# Patient Record
Sex: FEMALE | Race: BLACK OR AFRICAN AMERICAN | NOT HISPANIC OR LATINO | Employment: UNEMPLOYED | URBAN - METROPOLITAN AREA
[De-identification: names, ages, dates, MRNs, and addresses within clinical notes are randomized per-mention and may not be internally consistent; named-entity substitution may affect disease eponyms.]

---

## 2023-03-14 LAB
AMPHETAMINE (PRESENCE) IN URINE BY SCREEN METHOD: ABNORMAL
BARBITURATES PRESENCE IN URINE BY SCREEN METHOD: ABNORMAL
BENZODIAZEPINE (PRESENCE) IN URINE BY SCREEN METHOD: ABNORMAL
CANNABINOIDS IN URINE BY SCREEN METHOD: ABNORMAL
COCAINE (PRESENCE) IN URINE BY SCREEN METHOD: ABNORMAL
DRUG SCREEN COMMENT URINE: ABNORMAL
FENTANYL URINE: ABNORMAL
METHADONE (PRESENCE) IN URINE BY SCREEN METHOD: ABNORMAL
OPIATES (PRESENCE) IN URINE BY SCREEN METHOD: ABNORMAL
OXYCODONE (PRESENCE) IN URINE BY SCREEN METHOD: ABNORMAL
PHENCYCLIDINE (PRESENCE) IN URINE BY SCREEN METHOD: ABNORMAL

## 2023-03-16 LAB
6-ACETYLMORPHINE: <25 NG/ML
CODEINE: <50 NG/ML
HYDROCODONE: 498 NG/ML
HYDROMORPHONE: <25 NG/ML
MORPHINE URINE: <50 NG/ML
NORHYDROCODONE: 692 NG/ML
NOROXYCODONE: <25 NG/ML
OXYCODONE: <25 NG/ML
OXYMORPHONE: <25 NG/ML

## 2023-10-19 DIAGNOSIS — M54.16 LUMBAR RADICULITIS: Primary | ICD-10-CM

## 2023-10-19 RX ORDER — HYDROCODONE BITARTRATE AND ACETAMINOPHEN 5; 325 MG/1; MG/1
1 TABLET ORAL 2 TIMES DAILY PRN
COMMUNITY
Start: 2017-01-01 | End: 2023-10-19 | Stop reason: SDUPTHER

## 2023-10-19 RX ORDER — HYDROCODONE BITARTRATE AND ACETAMINOPHEN 5; 325 MG/1; MG/1
1 TABLET ORAL 2 TIMES DAILY PRN
Qty: 60 TABLET | Refills: 0 | Status: SHIPPED | OUTPATIENT
Start: 2023-10-19 | End: 2023-11-18

## 2024-01-16 ENCOUNTER — APPOINTMENT (OUTPATIENT)
Dept: PAIN MEDICINE | Facility: CLINIC | Age: 59
End: 2024-01-16
Payer: COMMERCIAL

## 2024-01-19 ENCOUNTER — APPOINTMENT (OUTPATIENT)
Dept: PAIN MEDICINE | Facility: CLINIC | Age: 59
End: 2024-01-19

## 2024-01-19 ENCOUNTER — TELEMEDICINE (OUTPATIENT)
Dept: PAIN MEDICINE | Facility: CLINIC | Age: 59
End: 2024-01-19
Payer: COMMERCIAL

## 2024-01-19 DIAGNOSIS — M96.1 FAILED BACK SYNDROME, CERVICAL: ICD-10-CM

## 2024-01-19 DIAGNOSIS — M54.40 CHRONIC LOW BACK PAIN WITH SCIATICA, SCIATICA LATERALITY UNSPECIFIED, UNSPECIFIED BACK PAIN LATERALITY: Primary | ICD-10-CM

## 2024-01-19 DIAGNOSIS — G89.29 CHRONIC LOW BACK PAIN WITH SCIATICA, SCIATICA LATERALITY UNSPECIFIED, UNSPECIFIED BACK PAIN LATERALITY: Primary | ICD-10-CM

## 2024-01-19 DIAGNOSIS — Z79.899 ENCOUNTER FOR LONG-TERM (CURRENT) USE OF HIGH-RISK MEDICATION: Primary | ICD-10-CM

## 2024-01-19 DIAGNOSIS — M54.16 LUMBAR RADICULOPATHY: ICD-10-CM

## 2024-01-19 DIAGNOSIS — M96.1 POSTLAMINECTOMY SYNDROME OF LUMBAR REGION: ICD-10-CM

## 2024-01-19 PROCEDURE — 99214 OFFICE O/P EST MOD 30 MIN: CPT | Performed by: NURSE PRACTITIONER

## 2024-01-19 RX ORDER — HYDROCODONE BITARTRATE AND ACETAMINOPHEN 5; 325 MG/1; MG/1
1 TABLET ORAL 2 TIMES DAILY PRN
Qty: 60 TABLET | Refills: 0 | Status: CANCELLED | OUTPATIENT
Start: 2024-01-19 | End: 2024-03-19

## 2024-01-19 RX ORDER — NALOXONE HYDROCHLORIDE 4 MG/.1ML
4 SPRAY NASAL AS NEEDED
Qty: 2 EACH | Refills: 0 | Status: SHIPPED | OUTPATIENT
Start: 2024-01-19

## 2024-01-19 RX ORDER — HYDROCODONE BITARTRATE AND ACETAMINOPHEN 5; 325 MG/1; MG/1
1 TABLET ORAL 2 TIMES DAILY PRN
Qty: 60 TABLET | Refills: 0 | Status: SHIPPED | OUTPATIENT
Start: 2024-01-19 | End: 2024-02-18

## 2024-01-19 RX ORDER — AMITRIPTYLINE HYDROCHLORIDE 25 MG/1
25 TABLET, FILM COATED ORAL NIGHTLY
Qty: 30 TABLET | Refills: 11 | COMMUNITY
Start: 2024-01-19 | End: 2024-04-17 | Stop reason: ALTCHOICE

## 2024-01-19 RX ORDER — ALPRAZOLAM 1 MG/1
1 TABLET ORAL 3 TIMES DAILY PRN
Qty: 30 TABLET | Refills: 0 | COMMUNITY
Start: 2024-01-19

## 2024-01-19 RX ORDER — DULOXETIN HYDROCHLORIDE 20 MG/1
20 CAPSULE, DELAYED RELEASE ORAL 2 TIMES DAILY
Qty: 60 CAPSULE | Refills: 5 | COMMUNITY
Start: 2024-01-19 | End: 2024-04-17 | Stop reason: WASHOUT

## 2024-01-19 NOTE — PROGRESS NOTES
Chief Complaint/HPI:  Pt. Presents today for telephone visit for medication refill. She endorses neck and low back pain with low back being more painful today. Low back pain extends down posterior right leg to the knee and is rated 6/10 today. Neck pain is rated 5/10 today and extends to both shoulders, primarily. PMH includes cervical and lumbar surgery. Daily movement includes doing stairs, and trying to ride a stationary bike. She has had several procedures with Dr. Ramires and these have been moderately beneficial for her.  She is maintained on hydrocodone.acetaminophen 5/325 mg BID prn, which she takes sparingly. Her daily medications of amitriptyline and Cymbalta will usually control her pain. She only take the hydrocodone if chronic pain increases. She is concurrently taking alprazolam.  The current medication regimen remains effective and she denies adverse side effects.    OARRS:  Laurence Stone, APRN-CNP on 1/19/2024  2:20 PM  I have personally reviewed the OARRS report for Jazzy Napoles. I have considered the risks of abuse, dependence, addiction and diversion    Is the patient prescribed a combination of a benzodiazepine and opioid?  Yes, I feel it is clincially indicated to continue the medication and have discussed with the patient risks/benefits/alternatives.    Last Urine Drug Screen / ordered today: Yes  Recent Results (from the past 8760 hour(s))   Opiate Confirmation, Urine    Collection Time: 03/14/23 11:53 AM   Result Value Ref Range    6-Acetylmorphine <25 Cutoff <25 ng/mL    Codeine <50 Cutoff <50 ng/mL    Hydrocodone 498 (A) Cutoff <25 ng/mL    Hydromorphone <25 Cutoff <25 ng/mL    Morphine Urine <50 Cutoff <50 ng/mL    Norhydrocodone 692 (A) Cutoff <25 ng/mL    Noroxycodone <25 Cutoff <25 ng/mL    Oxycodone <25 Cutoff <25 ng/mL    Oxymorphone <25 Cutoff <25 ng/mL   Drug Screen, Urine With Reflex to Confirmation    Collection Time: 03/14/23 11:53 AM   Result Value Ref Range    DRUG  SCREEN COMMENT URINE SEE BELOW     Amphetamine Screen, Urine PRESUMPTIVE NEGATIVE NEGATIVE    Barbiturate Screen, Urine PRESUMPTIVE NEGATIVE NEGATIVE    BENZODIAZEPINE (PRESENCE) IN URINE BY SCREEN METHOD PRESUMPTIVE NEGATIVE NEGATIVE    Cannabinoid Screen, Urine PRESUMPTIVE NEGATIVE NEGATIVE    Cocaine Screen, Urine PRESUMPTIVE NEGATIVE NEGATIVE    Fentanyl, Ur PRESUMPTIVE NEGATIVE NEGATIVE    Methadone Screen, Urine PRESUMPTIVE NEGATIVE NEGATIVE    Opiate Screen, Urine PRESUMPTIVE POSITIVE (A) NEGATIVE    Oxycodone Screen, Ur PRESUMPTIVE NEGATIVE NEGATIVE    PCP Screen, Urine PRESUMPTIVE NEGATIVE NEGATIVE     Results are as expected.     Controlled Substance Agreement:  Date of the Last Agreement: 1/19/2024  Reviewed Controlled Substance Agreement including but not limited to the benefits, risks, and alternatives to treatment with a Controlled Substance medication(s).      ROS otherwise negative aside from what was mentioned above in HPI.      There is no problem list on file for this patient.    Past Medical History:   Diagnosis Date    Other chronic pain     Chronic pain    Other conditions influencing health status 11/03/2014    History of dyspareunia    Personal history of other diseases of the digestive system     History of esophageal reflux    Personal history of other diseases of the musculoskeletal system and connective tissue     History of arthritis    Personal history of other diseases of the musculoskeletal system and connective tissue     History of low back pain    Personal history of other diseases of the musculoskeletal system and connective tissue     History of backache    Personal history of other mental and behavioral disorders     History of depression    Pure hypercholesterolemia, unspecified     High cholesterol     Past Surgical History:   Procedure Laterality Date    BREAST LUMPECTOMY  11/03/2014    Breast Surgery Lumpectomy    CERVICAL FUSION  10/23/2013    Cervical Vertebral Fusion      SECTION, CLASSIC  2014     Section    HYSTERECTOMY  2014    Hysterectomy    LUMBAR FUSION  10/23/2013    Lumbar Vertebral Fusion    OTHER SURGICAL HISTORY  2016    Oophorectomy - Bilateral (Removal Of Both Ovaries)     No family history on file.  Social History     Tobacco Use    Smoking status: Unknown         ALLERGIES  Allergies   Allergen Reactions    Bactrim [Sulfamethoxazole-Trimethoprim] Swelling    Celexa [Citalopram] Swelling    Darvocet A500 [Propoxyphene N-Acetaminophen] Swelling    Hydromorphone GI Upset    Methocarbamol Unknown    Morphine GI Upset    Pregabalin Swelling    Statins-Hmg-Coa Reductase Inhibitors Itching    Sulfa (Sulfonamide Antibiotics) Itching    Gabapentin Palpitations    Meloxicam Palpitations    Penicillin Palpitations         MEDICATIONS  Current Outpatient Medications   Medication Sig Dispense Refill    ALPRAZolam (Xanax) 1 mg tablet Take 1 tablet (1 mg) by mouth 3 times a day as needed for anxiety. 30 tablet 0    amitriptyline (Elavil) 25 mg tablet Take 1 tablet (25 mg) by mouth once daily at bedtime. 30 tablet 11    DULoxetine (Cymbalta) 20 mg DR capsule Take 1 capsule (20 mg) by mouth 2 times a day. Do not crush or chew. 60 capsule 5     No current facility-administered medications for this visit.     PHYSICAL EXAM  Due to the nature of telehealth the physical exam is limited.  Gen: Alert, NAD  Respiratory:  No SOB, normal effort  Psych: Appropriate, cooperative      ASSESSMENT/PLAN  Problem List Items Addressed This Visit    None  Visit Diagnoses       Lumbar radiculopathy              Encounter Diagnoses   Name Primary?    Lumbar radiculopathy     Postlaminectomy syndrome of lumbar region     Failed back syndrome, cervical     Encounter for long-term (current) use of high-risk medication Yes     Will continue plan of care as established by Dr. Ramires.  -Continue hydrocodone/acetaminophen as currently prescribed  -UDS and CSA updated today.  Pt. Has no further questions  -RTC 3 mos or as needed. Reinforced to pt. Every 3 mos visit    The patient was counseled on the appropriate use of the opioid medication, its potential dangers and the responsibilities that go along with being prescribed opioid medications including safe storage, use and disposal of the medication. Patient was also counseled on the adverse effects of long-term opioid use including hyperalgesia, tolerance, decreased immune function, and changes to the body's natural hormones.  The patient also understands the potential risks for respiratory depression especially if the medication is combined with other central nervous system depressants such as benzodiazepines or alcohol.    Laurence Stone, APRN-CNP

## 2024-01-25 ENCOUNTER — TELEPHONE (OUTPATIENT)
Dept: PAIN MEDICINE | Facility: HOSPITAL | Age: 59
End: 2024-01-25
Payer: COMMERCIAL

## 2024-01-25 NOTE — TELEPHONE ENCOUNTER
Ms. Saldana left message saying that her father had MI and she has been out of state so she hasn't gone to lab . She will give specimen when she returns to [place].

## 2024-02-01 ENCOUNTER — TELEPHONE (OUTPATIENT)
Dept: RHEUMATOLOGY | Facility: CLINIC | Age: 59
End: 2024-02-01

## 2024-02-01 NOTE — TELEPHONE ENCOUNTER
Refill Authorization Request  [facility]  Amitriptyline HCL 75mg 90 tabs

## 2024-02-26 ENCOUNTER — LAB (OUTPATIENT)
Dept: LAB | Facility: LAB | Age: 59
End: 2024-02-26
Payer: COMMERCIAL

## 2024-02-26 DIAGNOSIS — Z79.899 ENCOUNTER FOR LONG-TERM (CURRENT) USE OF HIGH-RISK MEDICATION: ICD-10-CM

## 2024-02-26 PROCEDURE — 80368 SEDATIVE HYPNOTICS: CPT

## 2024-02-26 PROCEDURE — 80373 DRUG SCREENING TRAMADOL: CPT

## 2024-02-26 PROCEDURE — 80365 DRUG SCREENING OXYCODONE: CPT

## 2024-02-26 PROCEDURE — 80346 BENZODIAZEPINES1-12: CPT

## 2024-02-26 PROCEDURE — 80354 DRUG SCREENING FENTANYL: CPT

## 2024-02-26 PROCEDURE — 80307 DRUG TEST PRSMV CHEM ANLYZR: CPT

## 2024-02-26 PROCEDURE — 82570 ASSAY OF URINE CREATININE: CPT

## 2024-02-26 PROCEDURE — 80358 DRUG SCREENING METHADONE: CPT

## 2024-02-26 PROCEDURE — 80361 OPIATES 1 OR MORE: CPT

## 2024-02-27 LAB
AMPHETAMINES UR QL SCN: NORMAL
BARBITURATES UR QL SCN: NORMAL
BZE UR QL SCN: NORMAL
CANNABINOIDS UR QL SCN: NORMAL
CREAT UR-MCNC: 119.7 MG/DL (ref 20–320)
PCP UR QL SCN: NORMAL

## 2024-02-28 ENCOUNTER — TELEPHONE (OUTPATIENT)
Dept: RHEUMATOLOGY | Facility: CLINIC | Age: 59
End: 2024-02-28

## 2024-02-29 LAB
1OH-MIDAZOLAM UR CFM-MCNC: <25 NG/ML
6MAM UR CFM-MCNC: <25 NG/ML
7AMINOCLONAZEPAM UR CFM-MCNC: <25 NG/ML
A-OH ALPRAZ UR CFM-MCNC: 66 NG/ML
ALPRAZ UR CFM-MCNC: 36 NG/ML
CHLORDIAZEP UR CFM-MCNC: <25 NG/ML
CLONAZEPAM UR CFM-MCNC: <25 NG/ML
CODEINE UR CFM-MCNC: <50 NG/ML
DIAZEPAM UR CFM-MCNC: <25 NG/ML
EDDP UR CFM-MCNC: <25 NG/ML
FENTANYL UR CFM-MCNC: <2.5 NG/ML
HYDROCODONE CTO UR CFM-MCNC: 464 NG/ML
HYDROMORPHONE UR CFM-MCNC: <25 NG/ML
LORAZEPAM UR CFM-MCNC: <25 NG/ML
METHADONE UR CFM-MCNC: <25 NG/ML
MIDAZOLAM UR CFM-MCNC: <25 NG/ML
MORPHINE UR CFM-MCNC: <50 NG/ML
NORDIAZEPAM UR CFM-MCNC: <25 NG/ML
NORFENTANYL UR CFM-MCNC: <2.5 NG/ML
NORHYDROCODONE UR CFM-MCNC: 531 NG/ML
NOROXYCODONE UR CFM-MCNC: <25 NG/ML
NORTRAMADOL UR-MCNC: <50 NG/ML
OXAZEPAM UR CFM-MCNC: <25 NG/ML
OXYCODONE UR CFM-MCNC: <25 NG/ML
OXYMORPHONE UR CFM-MCNC: <25 NG/ML
TEMAZEPAM UR CFM-MCNC: <25 NG/ML
TRAMADOL UR CFM-MCNC: <50 NG/ML
ZOLPIDEM UR CFM-MCNC: <25 NG/ML
ZOLPIDEM UR-MCNC: <25 NG/ML

## 2024-04-03 ENCOUNTER — APPOINTMENT (OUTPATIENT)
Dept: RADIOLOGY | Facility: CLINIC | Age: 59
End: 2024-04-03
Payer: COMMERCIAL

## 2024-04-09 ENCOUNTER — HOSPITAL ENCOUNTER (OUTPATIENT)
Dept: RADIOLOGY | Facility: CLINIC | Age: 59
Discharge: HOME | End: 2024-04-09
Payer: COMMERCIAL

## 2024-04-09 VITALS — BODY MASS INDEX: 31.24 KG/M2 | WEIGHT: 183 LBS | HEIGHT: 64 IN

## 2024-04-09 DIAGNOSIS — Z12.31 ENCOUNTER FOR SCREENING MAMMOGRAM FOR MALIGNANT NEOPLASM OF BREAST: ICD-10-CM

## 2024-04-09 PROCEDURE — 77067 SCR MAMMO BI INCL CAD: CPT | Mod: LIO | Performed by: STUDENT IN AN ORGANIZED HEALTH CARE EDUCATION/TRAINING PROGRAM

## 2024-04-09 PROCEDURE — 77067 SCR MAMMO BI INCL CAD: CPT | Mod: LIO

## 2024-04-09 PROCEDURE — 77063 BREAST TOMOSYNTHESIS BI: CPT | Mod: LIO | Performed by: STUDENT IN AN ORGANIZED HEALTH CARE EDUCATION/TRAINING PROGRAM

## 2024-04-10 ENCOUNTER — APPOINTMENT (OUTPATIENT)
Dept: RHEUMATOLOGY | Facility: CLINIC | Age: 59
End: 2024-04-10
Payer: COMMERCIAL

## 2024-04-15 NOTE — PROGRESS NOTES
Rheumatology Office Visit      Subjective     Patient ID: [id number]     Jazzy Napoles is a 58 y.o. female who presents for Med Refill    Subjective    Jazzy Napoles is a 59yo F w/a history of chronic pain 2/2 polyarticular OA, lumbar and cervicaly spondylosis with post-laminectomy syndrome, and fibromyalgia presenting for follow-up. Last seen 08/2023 at which time trial of topical diclofenac recommended and regimen otherwise maintained the same. She is established with pain mgmt.     Today patient states DIPs have gotten more prominent and bothersome if she uses hands or when she gets her nails done. She has constant diffuse pain throughout the body including all joints and the back. She feels movement distracts her from the pain so she feels better in the summer when she can be outside gardening. Changes in weather make pain worse. Has trouble falling asleep, has pain when she lays down at the end of the night, but once she's asleep she stays asleep. Melatonin occasionally helps. She is very sensitive to touch. Mood is good, she's been listening to Bible studies. Takes Vicodin as needed which helps, takes about 1/2 pill once a day or less. Still taking Cymbalta. Has LE edema at the end of the day but no other joint swelling. Appetite is good.      Current treatment:  - Amitriptyline 75mg daily  - Cymbalta 60mg daily  - Tizanidine 4mg BID PRN  - Lidocaine 5% patches  - Norco 5-325mg BID PRN (prescribed by pain mgmt)    Review of Systems  As per HPI     Objective  Temp: 36.5 °C (97.7 °F)  Heart Rate: 97  BP: 142/81 (high bp)    Physical Exam  General: Cooperative, in NAD   Eyes: Conjunctiva clear, sclera white, anicteric   Lungs: No respiratory distress; lungs CTAB; no wheezes, rales, rhonchi, or stridor   Heart: Normal S1 and S2; regular rate and rhythm; no murmurs, rubs, or gallops  Skin: No rashes, ulcers, tophi, or nodules noted  MSK:   Widespread myofascial TTP  Upper Extremities:   Hands: No  "erythema, warmth, synovitis of the DIPs, PIPs, or MCPs; normal ROM; bony hypertrophy at the DIPs w/TTP and squaring of the b/l CMCs  Wrists: No erythema, warmth, synovitis of the wrists; normal ROM  Elbows: No erythema, warmth, synovitis; normal ROM   Shoulders: No erythema, warmth, appreciable swelling; normal ROM   Lower Extremities:   Knees: No erythema, warmth, swelling; normal ROM   Ankles: No erythema, warmth, synovitis; normal ROM    Last BMP: No results found for: \"NA\", \"K\", \"CL\", \"CO2\", \"BUN\", \"GLU\", \"CREATININE\", \"CALCIUM\"  Last CBC: No components found for: \"CBC\"  Last CRP:   CRP (mg/dL)   Date Value   02/26/2019 1.46 (A)     Last ESR:   Sedimentation Rate (mm/h)   Date Value   02/26/2019 11     Last Hepatic Function Results: No results found for: \"BILITOT\", \"ALT\", \"AST\", \"ALKPHOS\", \"ALBUMIN\", \"PROT\"    Assessment/Plan   Diagnoses and all orders for this visit:  Fibromyalgia  -     amitriptyline (Elavil) 75 mg tablet; TAKE 1 TABLET (75 MG) BY MOUTH AT BEDTIME  -     DULoxetine (Cymbalta) 60 mg DR capsule; Take one tablet daily  -     lidocaine (Lidoderm) 5 % patch; Use one patch every 12 hours as needed for pain  -     tiZANidine (Zanaflex) 4 mg capsule; Take 1 capsule (4 mg) by mouth 2 times a day as needed for muscle spasms.  Polyarticular osteoarthritis  -     DULoxetine (Cymbalta) 60 mg DR capsule; Take one tablet daily  -     lidocaine (Lidoderm) 5 % patch; Use one patch every 12 hours as needed for pain  Post laminectomy syndrome  -     lidocaine (Lidoderm) 5 % patch; Use one patch every 12 hours as needed for pain    Jazzy WellsKayli is a 59yo F w/a history of chronic pain 2/2 polyarticular OA, lumbar and cervicaly spondylosis with post-laminectomy syndrome, and fibromyalgia presenting for follow-up. She has no autoimmune rheumatic disease and is established with pain mgmt for OA, post-laminectomy syndrome, and fibromyalgia. Refills provided for Elavil, Cymbalta, Zanaflex, and lidocaine patches " today. Pain mgmt is the prescriber of her opiate pain medication. Discussed conservative measures including exercise and sleep hygiene. She prefers to continue following with Rheumatology as well as PCP and pain mgmt for her conditions.     Follow-up 6 mos    Patient seen and discussed with attending Dr. Junaid Wilson MD  PGY-V, Rheumatology

## 2024-04-16 ENCOUNTER — APPOINTMENT (OUTPATIENT)
Dept: RHEUMATOLOGY | Facility: CLINIC | Age: 59
End: 2024-04-16
Payer: COMMERCIAL

## 2024-04-17 ENCOUNTER — OFFICE VISIT (OUTPATIENT)
Dept: RHEUMATOLOGY | Facility: CLINIC | Age: 59
End: 2024-04-17
Payer: COMMERCIAL

## 2024-04-17 VITALS
HEIGHT: 64 IN | HEART RATE: 97 BPM | TEMPERATURE: 97.7 F | DIASTOLIC BLOOD PRESSURE: 81 MMHG | SYSTOLIC BLOOD PRESSURE: 142 MMHG | BODY MASS INDEX: 30.56 KG/M2 | WEIGHT: 179 LBS

## 2024-04-17 DIAGNOSIS — M96.1 POST LAMINECTOMY SYNDROME: ICD-10-CM

## 2024-04-17 DIAGNOSIS — M79.7 FIBROMYALGIA: Primary | ICD-10-CM

## 2024-04-17 DIAGNOSIS — M15.9 POLYARTICULAR OSTEOARTHRITIS: ICD-10-CM

## 2024-04-17 PROCEDURE — 99214 OFFICE O/P EST MOD 30 MIN: CPT | Mod: GC | Performed by: STUDENT IN AN ORGANIZED HEALTH CARE EDUCATION/TRAINING PROGRAM

## 2024-04-17 PROCEDURE — 99214 OFFICE O/P EST MOD 30 MIN: CPT | Performed by: STUDENT IN AN ORGANIZED HEALTH CARE EDUCATION/TRAINING PROGRAM

## 2024-04-17 RX ORDER — LORATADINE 10 MG/1
10 TABLET ORAL EVERY 12 HOURS
COMMUNITY
Start: 2023-07-25

## 2024-04-17 RX ORDER — DULOXETIN HYDROCHLORIDE 60 MG/1
CAPSULE, DELAYED RELEASE ORAL
COMMUNITY
End: 2024-04-17 | Stop reason: SDUPTHER

## 2024-04-17 RX ORDER — CHLORHEXIDINE GLUCONATE ORAL RINSE 1.2 MG/ML
SOLUTION DENTAL
COMMUNITY
Start: 2024-03-26

## 2024-04-17 RX ORDER — TIZANIDINE HYDROCHLORIDE 4 MG/1
4 CAPSULE, GELATIN COATED ORAL 2 TIMES DAILY PRN
Qty: 180 CAPSULE | Refills: 1 | Status: SHIPPED | OUTPATIENT
Start: 2024-04-17 | End: 2024-10-14

## 2024-04-17 RX ORDER — LIDOCAINE 50 MG/G
PATCH TOPICAL
COMMUNITY
Start: 2016-11-29 | End: 2024-04-17 | Stop reason: SDUPTHER

## 2024-04-17 RX ORDER — METOPROLOL TARTRATE 25 MG/1
TABLET, FILM COATED ORAL EVERY 12 HOURS
COMMUNITY
Start: 2014-08-06

## 2024-04-17 RX ORDER — AMITRIPTYLINE HYDROCHLORIDE 75 MG/1
TABLET ORAL
Qty: 90 TABLET | Refills: 1 | Status: SHIPPED | OUTPATIENT
Start: 2024-04-17

## 2024-04-17 RX ORDER — LIDOCAINE 50 MG/G
PATCH TOPICAL
Qty: 30 PATCH | Refills: 5 | Status: SHIPPED | OUTPATIENT
Start: 2024-04-17

## 2024-04-17 RX ORDER — TIZANIDINE HYDROCHLORIDE 4 MG/1
4 CAPSULE, GELATIN COATED ORAL
COMMUNITY
Start: 2017-03-23 | End: 2024-04-17 | Stop reason: SDUPTHER

## 2024-04-17 RX ORDER — AMITRIPTYLINE HYDROCHLORIDE 75 MG/1
TABLET ORAL
COMMUNITY
End: 2024-04-17 | Stop reason: ALTCHOICE

## 2024-04-17 RX ORDER — HYDROCODONE BITARTRATE AND ACETAMINOPHEN 5; 325 MG/1; MG/1
1 TABLET ORAL 2 TIMES DAILY PRN
COMMUNITY
Start: 2017-01-01 | End: 2024-05-28 | Stop reason: SDUPTHER

## 2024-04-17 RX ORDER — SEMAGLUTIDE 1.34 MG/ML
1 INJECTION, SOLUTION SUBCUTANEOUS
COMMUNITY
Start: 2023-09-22

## 2024-04-17 RX ORDER — METFORMIN HYDROCHLORIDE 500 MG/1
TABLET, EXTENDED RELEASE ORAL
COMMUNITY
End: 2024-06-10 | Stop reason: WASHOUT

## 2024-04-17 RX ORDER — GLIPIZIDE 10 MG/1
TABLET ORAL
COMMUNITY
Start: 2014-11-03

## 2024-04-17 RX ORDER — OMEPRAZOLE 20 MG/1
20 CAPSULE, DELAYED RELEASE ORAL
COMMUNITY
Start: 2022-01-04 | End: 2025-03-14

## 2024-04-17 RX ORDER — DULOXETIN HYDROCHLORIDE 60 MG/1
CAPSULE, DELAYED RELEASE ORAL
Qty: 90 CAPSULE | Refills: 1 | Status: SHIPPED | OUTPATIENT
Start: 2024-04-17

## 2024-04-17 RX ORDER — GLUCOSAM/CHONDRO/HERB 149/HYAL 750-100 MG
TABLET ORAL
COMMUNITY

## 2024-04-17 ASSESSMENT — PAIN SCALES - GENERAL: PAINLEVEL: 5

## 2024-04-17 NOTE — PROGRESS NOTES
I saw and evaluated the patient. I personally obtained the key and critical portions of the history and physical exam or was physically present for key and critical portions performed by the trainee. I reviewed the trainee's documentation and discussed the patient with the trainee. I agree with the trainee's medical decision making, as documented on the trainee's note.     Justice Quiroga MD

## 2024-05-28 ENCOUNTER — OFFICE VISIT (OUTPATIENT)
Dept: PAIN MEDICINE | Facility: HOSPITAL | Age: 59
End: 2024-05-28
Payer: COMMERCIAL

## 2024-05-28 DIAGNOSIS — M54.16 LUMBAR RADICULOPATHY: Primary | ICD-10-CM

## 2024-05-28 DIAGNOSIS — M54.16 LUMBAR RADICULOPATHY: ICD-10-CM

## 2024-05-28 DIAGNOSIS — M96.1 POSTLAMINECTOMY SYNDROME OF LUMBAR REGION: ICD-10-CM

## 2024-05-28 PROCEDURE — 99214 OFFICE O/P EST MOD 30 MIN: CPT | Performed by: ANESTHESIOLOGY

## 2024-05-28 RX ORDER — HYDROCODONE BITARTRATE AND ACETAMINOPHEN 5; 325 MG/1; MG/1
1 TABLET ORAL 2 TIMES DAILY PRN
Qty: 60 TABLET | Refills: 0 | Status: SHIPPED | OUTPATIENT
Start: 2024-05-28 | End: 2024-06-27

## 2024-05-28 ASSESSMENT — PAIN SCALES - GENERAL: PAINLEVEL: 7

## 2024-05-28 NOTE — PROGRESS NOTES
Chief Complaint   Patient presents with    Back Pain    Extremity Pain       History of present illness:  Jazzy is a 58-year-old female with a history of back and leg symptoms.  She has had prior lumbar fusion at L3-4.  She was last seen on 2/27/2023 for a bilateral L4 transforaminal which was targeting the area just below her prior surgery.  The patient had 100% relief of her symptoms following the aforementioned epidural injection until about 2 months ago when the pain slowly restarted.  Pain has also only recurred on the right side.  She is having radicular symptoms all the way down the leg to the ankle.  She does take Vicodin very sparingly.  She uses 60 tabs of Vicodin January 19th to current.  She denies any negative effects with Vicodin and notes improvement in her symptoms and function.    Pain at times can now be up to an 8 out of 10.    Controlled substance documentation:    I have personally reviewed the OARRS report today 5/28/2024.  I have considered the risks of abuse, dependence, addiction, and diversion.  I feel that is clinically indicated to continue this medication regimen after consideration of alternative therapies and other nonopioid treatments.  I have reviewed the controlled substance agreement line by line with the patient and they have verbalized understanding.    Last date of urine drug screen:  -1/19/2024  Last date controlled substance agreement obtained:  -1/19/2024  Opioid risk tool:  Last date ORT completed: Today  Risk level: Low risk    Alternatives to opioid management:  -NSAIDs, Tylenol, muscle relaxer, membrane stabilizer, physical therapy, surgical intervention, interventional pain procedures    Review of systems: 13 point review of systems was completed and is negative unless stated above in HPI    Physical exam:  Gen.: Patient appears to be stated age, fair hygiene  Eyes: Sclera clear  ENT: Hearing is grossly intact  Neck: No JVD noted, tracheal position is  midline  Respiratory: No SOB  Cardiovascular: Extremity show no edema or varicosities  Skin: No rashes or open lesions or ulcers identified on the skin  Musculoskeletal: No weakness, positive SLR on right otherwise unremarkable  Neurologic: Cranial nerves II through XII are grossly intact  Psychiatric:  Patient is alert and oriented x3    Imaging/Labs:  MRN: [id number]  Patient Name: ADAMARIS GRAVES     STUDY:  MRI L-SPINE WO;  12/30/2022 6:45 pm     INDICATION:  low back pain  M51.26: Lumbar herniated disc.     COMPARISON:  November 2015.     ACCESSION NUMBER(S):  [id number]     ORDERING CLINICIAN:  MATA TIPTON     TECHNIQUE:  The lumbar spine was studied in the sagital, axial and coronal planes  utiliing T1 and T2 weighted images.     FINDINGS:  The marrow signal and vertebral body height are normal. The conus and  sacrum are normal.  Images at each interspace reveal the following:  L1/L2  There is normal alignment and vertebral body height. The disc space  is normal. There is no evidence of canal or foraminal narrowing.  There is no evidence of bulging or herniated disc.  L2/L3  Bilateral facet hypertrophy and mild bulging disc without canal or  foraminal narrowing  L3/L4  Previous interbody fusion and posterior fixation without canal or  foraminal narrowing  L4/L5  Previous laminectomy with residual deformity of the thecal sac  unchanged from the previous exam. There is been interval development  of a midline disc herniation or focus of granulation tissue measuring  approximally 5 mm x 10 mm in size and best appreciated on  parasagittal T2 weighted image 11/19. There is been progressive  narrowing of the thecal sac at this level which is best appreciated  on axial 10/24 compared to the previous exam.  L5/S1  Bilateral facet hypertrophy and bulging intervertebral disc. No  measurable canal stenosis. Severe bilateral foraminal narrowing.        IMPRESSION:  * Postoperative changes as previously  described  *Unchanged bulging discs at L4/L5 and L5/S1 with progressive canal  narrowing at L4/L5 due to a midline soft tissue structure consistent  with herniated disc fragment or evolving granulation tissue.    Impression and plan:  58-year-old female with a history of back and leg symptoms and prior lumbar fusion.  The patient has known degenerative changes most significant just below her prior surgery.  She had an epidural injection, transforaminal which gave her 100% relief from February 2023 until 2 months ago.  Patient had recurrence of symptoms on the right side.    -Will plan for right-sided transforaminal at L4.  Procedure, risk, benefits, alternatives reviewed with the patient.    -Patient is up-to-date on her urine drug screen and controlled substance agreement which were completed in January.  Will renew Vicodin 5/325 total number of tabs 60 to be dispensed.  She uses the medication sparingly and her last refill was on 1/19/2024.  She does take occasional alprazolam but does not take it concurrently with Vicodin.  Risks associated with concurrent benzo and he would use were reviewed.    -Encouraged to work on physical therapy and core strengthening.    -Discussed with patient that at some point injections may not help and if her symptoms progress or are not relieved with conservative measures she may need to see the spine surgery back.

## 2024-06-06 ENCOUNTER — APPOINTMENT (OUTPATIENT)
Dept: OBSTETRICS AND GYNECOLOGY | Facility: HOSPITAL | Age: 59
End: 2024-06-06
Payer: COMMERCIAL

## 2024-06-07 PROBLEM — E78.00 HYPERCHOLESTEROLEMIA: Status: ACTIVE | Noted: 2023-05-23

## 2024-06-07 PROBLEM — M79.7 FIBROMYALGIA: Status: ACTIVE | Noted: 2024-06-07

## 2024-06-07 PROBLEM — M26.609 TMJ DISEASE: Status: ACTIVE | Noted: 2024-06-07

## 2024-06-07 PROBLEM — M47.816 SPONDYLOSIS WITHOUT MYELOPATHY OR RADICULOPATHY, LUMBAR REGION: Status: ACTIVE | Noted: 2024-06-07

## 2024-06-07 PROBLEM — M19.049 OSTEOARTHRITIS, HAND: Status: ACTIVE | Noted: 2024-06-07

## 2024-06-07 PROBLEM — K21.9 LARYNGOPHARYNGEAL REFLUX (LPR): Status: ACTIVE | Noted: 2024-06-07

## 2024-06-07 PROBLEM — M51.26 LUMBAR HERNIATED DISC: Status: ACTIVE | Noted: 2024-06-07

## 2024-06-07 PROBLEM — M47.812 SPONDYLOSIS OF CERVICAL REGION WITHOUT MYELOPATHY OR RADICULOPATHY: Status: ACTIVE | Noted: 2024-06-07

## 2024-06-07 PROBLEM — M15.9 OSTEOARTHRITIS OF MULTIPLE JOINTS: Status: ACTIVE | Noted: 2023-11-14

## 2024-06-07 PROBLEM — M79.2 PERIPHERAL NEUROPATHIC PAIN: Status: ACTIVE | Noted: 2024-06-07

## 2024-06-07 PROBLEM — I10 HYPERTENSION, BENIGN: Status: ACTIVE | Noted: 2023-05-26

## 2024-06-07 PROBLEM — F33.1 MODERATE EPISODE OF RECURRENT MAJOR DEPRESSIVE DISORDER (MULTI): Status: ACTIVE | Noted: 2024-05-21

## 2024-06-07 PROBLEM — K29.70 GASTRITIS: Status: ACTIVE | Noted: 2023-05-24

## 2024-06-07 PROBLEM — E55.9 VITAMIN D DEFICIENCY: Status: ACTIVE | Noted: 2023-05-26

## 2024-06-07 PROBLEM — F41.1 GENERALIZED ANXIETY DISORDER: Status: ACTIVE | Noted: 2023-05-26

## 2024-06-07 PROBLEM — E11.9 DIABETES MELLITUS (MULTI): Status: ACTIVE | Noted: 2023-02-27

## 2024-06-07 PROBLEM — E11.9 TYPE 2 DIABETES MELLITUS WITHOUT COMPLICATION (MULTI): Status: ACTIVE | Noted: 2023-05-26

## 2024-06-10 ENCOUNTER — OFFICE VISIT (OUTPATIENT)
Dept: CARDIOLOGY | Facility: CLINIC | Age: 59
End: 2024-06-10
Payer: COMMERCIAL

## 2024-06-10 VITALS
WEIGHT: 181.06 LBS | BODY MASS INDEX: 30.91 KG/M2 | HEART RATE: 89 BPM | OXYGEN SATURATION: 91 % | DIASTOLIC BLOOD PRESSURE: 75 MMHG | HEIGHT: 64 IN | SYSTOLIC BLOOD PRESSURE: 128 MMHG

## 2024-06-10 DIAGNOSIS — I10 HYPERTENSION, BENIGN: ICD-10-CM

## 2024-06-10 PROCEDURE — 1036F TOBACCO NON-USER: CPT | Performed by: INTERNAL MEDICINE

## 2024-06-10 PROCEDURE — 3060F POS MICROALBUMINURIA REV: CPT | Performed by: INTERNAL MEDICINE

## 2024-06-10 PROCEDURE — 3074F SYST BP LT 130 MM HG: CPT | Performed by: INTERNAL MEDICINE

## 2024-06-10 PROCEDURE — 3078F DIAST BP <80 MM HG: CPT | Performed by: INTERNAL MEDICINE

## 2024-06-10 PROCEDURE — 4010F ACE/ARB THERAPY RXD/TAKEN: CPT | Performed by: INTERNAL MEDICINE

## 2024-06-10 PROCEDURE — 99214 OFFICE O/P EST MOD 30 MIN: CPT | Performed by: INTERNAL MEDICINE

## 2024-06-10 PROCEDURE — 99204 OFFICE O/P NEW MOD 45 MIN: CPT | Performed by: INTERNAL MEDICINE

## 2024-06-10 RX ORDER — METFORMIN HYDROCHLORIDE 1000 MG/1
1000 TABLET ORAL
COMMUNITY
Start: 2024-06-04

## 2024-06-10 RX ORDER — LOSARTAN POTASSIUM 50 MG/1
50 TABLET ORAL DAILY
COMMUNITY

## 2024-06-10 ASSESSMENT — PAIN SCALES - GENERAL: PAINLEVEL: 0-NO PAIN

## 2024-06-10 ASSESSMENT — ENCOUNTER SYMPTOMS
BLOATING: 0
CONSTIPATION: 0
MEMORY LOSS: 0
VOMITING: 0
HEADACHES: 0
FEVER: 0
COUGH: 0
OCCASIONAL FEELINGS OF UNSTEADINESS: 0
CHILLS: 0
DEPRESSION: 0
LOSS OF SENSATION IN FEET: 0
DEPRESSION: 1
NAUSEA: 0
MYALGIAS: 0
DIARRHEA: 0
FALLS: 0
ALTERED MENTAL STATUS: 0
DYSURIA: 0
HEMOPTYSIS: 0
HEMATURIA: 0
WHEEZING: 0
ABDOMINAL PAIN: 0

## 2024-06-10 ASSESSMENT — PATIENT HEALTH QUESTIONNAIRE - PHQ9
SUM OF ALL RESPONSES TO PHQ9 QUESTIONS 1 AND 2: 2
10. IF YOU CHECKED OFF ANY PROBLEMS, HOW DIFFICULT HAVE THESE PROBLEMS MADE IT FOR YOU TO DO YOUR WORK, TAKE CARE OF THINGS AT HOME, OR GET ALONG WITH OTHER PEOPLE: NOT DIFFICULT AT ALL
1. LITTLE INTEREST OR PLEASURE IN DOING THINGS: SEVERAL DAYS
2. FEELING DOWN, DEPRESSED OR HOPELESS: SEVERAL DAYS

## 2024-06-10 ASSESSMENT — COLUMBIA-SUICIDE SEVERITY RATING SCALE - C-SSRS
2. HAVE YOU ACTUALLY HAD ANY THOUGHTS OF KILLING YOURSELF?: NO
1. IN THE PAST MONTH, HAVE YOU WISHED YOU WERE DEAD OR WISHED YOU COULD GO TO SLEEP AND NOT WAKE UP?: NO
6. HAVE YOU EVER DONE ANYTHING, STARTED TO DO ANYTHING, OR PREPARED TO DO ANYTHING TO END YOUR LIFE?: NO

## 2024-06-10 NOTE — PROGRESS NOTES
Chief Complaint   Patient presents with    New Patient Visit    Hypertension    Chest Pain      HPI  59 yo BF w/ h/o HTN, HLD, DM, GERD now here for cardiology consult. 4/24 hosp for CP at rest x 2 hours (no assoc symptoms) -> TPN/ECG neg. She has long h/o occ CP at rest x few minutes, no radiation, no assoc symptoms. No CP with walking 3d/wk.  No dyspnea at rest. No WAGNER unless stairs. No orthopnea/PND. No palps. No LH/dizzy/syncope. No edema. No claudication. No cough.   ECG 1/17: SR (93), normal  ECG 4/24: SR (88), normal  Nuc 11/15: no ischemia/scar, EF >65%  CTA chest 4/24: no CAC, nl heart size, no peric eff, nl Ao, nl PA  CTA ab 4/24: no AAA, patent vessels  RLE US 12/22: no DVT  MRI brain 4/24: no acute abnl  MRA head/neck 4/24: no sig stenois    Review of Systems   Constitutional: Negative for chills, fever and malaise/fatigue.   HENT:  Negative for hearing loss.    Eyes:  Negative for visual disturbance.   Respiratory:  Negative for cough, hemoptysis and wheezing.    Skin:  Negative for rash.   Musculoskeletal:  Negative for falls and myalgias.   Gastrointestinal:  Negative for bloating, abdominal pain, constipation, diarrhea, dysphagia, nausea and vomiting.   Genitourinary:  Negative for dysuria and hematuria.   Neurological:  Negative for headaches.   Psychiatric/Behavioral:  Negative for altered mental status, depression and memory loss.       Social History     Tobacco Use   Smoking Status Never   Smokeless Tobacco Never      Family History   Problem Relation Name Age of Onset    Breast cancer Paternal Grandmother        Allergies   Allergen Reactions    Bactrim [Sulfamethoxazole-Trimethoprim] Swelling    Celexa [Citalopram] Swelling    Darvocet A500 [Propoxyphene N-Acetaminophen] Swelling    Hydromorphone GI Upset    Methocarbamol Unknown    Morphine GI Upset    Pregabalin Swelling    Statins-Hmg-Coa Reductase Inhibitors Itching    Sulfa (Sulfonamide Antibiotics) Itching    Gabapentin Palpitations     Meloxicam Palpitations    Penicillin Palpitations      Current Outpatient Medications   Medication Instructions    ALPRAZolam (XANAX) 1 mg, oral, 3 times daily PRN    amitriptyline (Elavil) 75 mg tablet TAKE 1 TABLET (75 MG) BY MOUTH AT BEDTIME    chlorhexidine (Peridex) 0.12 % solution RINSE 15 ML'S TWICE DAILY AFTER BREAKFAST/BEFORE BEDTIME FOLLOWING BRUSHING AND FLOSSING    DULoxetine (Cymbalta) 60 mg DR capsule Take one tablet daily    glipiZIDE (Glucotrol) 10 mg tablet     HYDROcodone-acetaminophen (Norco) 5-325 mg tablet 1 tablet, oral, 2 times daily PRN    lidocaine (Lidoderm) 5 % patch Use one patch every 12 hours as needed for pain    loratadine (CLARITIN) 10 mg, oral, Every 12 hours    losartan (COZAAR) 50 mg, oral, Daily    metFORMIN (GLUCOPHAGE) 1,000 mg, oral, 2 times daily (morning and late afternoon)    metFORMIN XR (metFORMIN, MOD,) 500 mg 24 hr tablet oral    metoprolol tartrate (Lopressor) 25 mg tablet oral, Every 12 hours    naloxone (NARCAN) 4 mg, nasal, As needed, May repeat every 2-3 minutes if needed, alternating nostrils, until medical assistance becomes available.    omega 3-dha-epa-fish oil (Fish OiL) 1,000 mg (120 mg-180 mg) capsule oral    omeprazole (PRILOSEC) 20 mg, oral    Ozempic 1 mg, subcutaneous    tiZANidine (ZANAFLEX) 4 mg, oral, 2 times daily PRN      Vitals:    06/10/24 1119   BP: 128/75   Pulse: 89   SpO2:       Physical Exam  Constitutional:       Appearance: Normal appearance.   HENT:      Head: Normocephalic and atraumatic.      Nose: Nose normal.   Neck:      Vascular: No carotid bruit.   Cardiovascular:      Rate and Rhythm: Normal rate and regular rhythm.      Heart sounds: No murmur heard.  Pulmonary:      Effort: Pulmonary effort is normal.      Breath sounds: Normal breath sounds.   Abdominal:      Palpations: Abdomen is soft.      Tenderness: There is no abdominal tenderness.   Musculoskeletal:      Right lower leg: No edema.      Left lower leg: No edema.   Skin:      General: Skin is warm and dry.   Neurological:      General: No focal deficit present.      Mental Status: She is alert.   Psychiatric:         Mood and Affect: Mood normal.         Judgment: Judgment normal.       Labs  4/24 Cr 0.78, K 4.2, Mg 1.8, LFT nl, , HDL 47, , Chol 231, HGB 12.1, , HSTPN 7     Assessment/Plan   57 yo BF w/ h/o HTN, HLD, DM, GERD now s/p fairly atypical CP most c/w musculoskeletal etiology. TPN neg despite 2hrs of CP goes against cardiac. ECG normal. CTA chest nl including no CAC. She walks 3d/wk with no CP. If CP increases, sep if more typical (ie with exertion), consider stress test (which she prefers to hold of for now).   -continue Metoprolol 25 bid (consider change to Coreg which is better for sugars  -continue Losartain 50 every day  -continue Zetia for HLD (intolerant of statins) -> goal LDL <100 (although with no CAC, not as strong)  ROB Zayas MD

## 2024-06-10 NOTE — PATIENT INSTRUCTIONS
If get chest pain with walking (or meet deductible), let me know so can order you a stress test ([phone])

## 2024-06-20 ENCOUNTER — APPOINTMENT (OUTPATIENT)
Dept: OBSTETRICS AND GYNECOLOGY | Facility: CLINIC | Age: 59
End: 2024-06-20
Payer: COMMERCIAL

## 2024-06-28 ENCOUNTER — HOSPITAL ENCOUNTER (OUTPATIENT)
Dept: RADIOLOGY | Facility: HOSPITAL | Age: 59
Discharge: HOME | End: 2024-06-28
Payer: COMMERCIAL

## 2024-06-28 VITALS
RESPIRATION RATE: 16 BRPM | HEART RATE: 92 BPM | HEIGHT: 64 IN | DIASTOLIC BLOOD PRESSURE: 70 MMHG | BODY MASS INDEX: 31.41 KG/M2 | OXYGEN SATURATION: 96 % | TEMPERATURE: 97 F | WEIGHT: 184 LBS | SYSTOLIC BLOOD PRESSURE: 130 MMHG

## 2024-06-28 DIAGNOSIS — M54.16 LUMBAR RADICULOPATHY: ICD-10-CM

## 2024-06-28 PROCEDURE — 2720000007 HC OR 272 NO HCPCS

## 2024-06-28 PROCEDURE — 64483 NJX AA&/STRD TFRM EPI L/S 1: CPT | Performed by: ANESTHESIOLOGY

## 2024-06-28 PROCEDURE — 64483 NJX AA&/STRD TFRM EPI L/S 1: CPT | Mod: RT | Performed by: ANESTHESIOLOGY

## 2024-06-28 ASSESSMENT — PAIN - FUNCTIONAL ASSESSMENT
PAIN_FUNCTIONAL_ASSESSMENT: 0-10
PAIN_FUNCTIONAL_ASSESSMENT: 0-10

## 2024-06-28 ASSESSMENT — PAIN SCALES - GENERAL
PAINLEVEL_OUTOF10: 4
PAINLEVEL_OUTOF10: 8
PAINLEVEL_OUTOF10: 8

## 2024-06-28 ASSESSMENT — COLUMBIA-SUICIDE SEVERITY RATING SCALE - C-SSRS
1. IN THE PAST MONTH, HAVE YOU WISHED YOU WERE DEAD OR WISHED YOU COULD GO TO SLEEP AND NOT WAKE UP?: NO
2. HAVE YOU ACTUALLY HAD ANY THOUGHTS OF KILLING YOURSELF?: NO
6. HAVE YOU EVER DONE ANYTHING, STARTED TO DO ANYTHING, OR PREPARED TO DO ANYTHING TO END YOUR LIFE?: NO

## 2024-06-28 NOTE — H&P
Pain Management H&P    History Of Present Illness  Jazzy Napoles is a 58 y.o. female presents for procedure state below. Endorses no changes in past medical history or medical health since last seen in clinic.      Past Medical History  She has a past medical history of Other chronic pain, Other conditions influencing health status (2014), Personal history of other diseases of the digestive system, Personal history of other diseases of the musculoskeletal system and connective tissue, Personal history of other diseases of the musculoskeletal system and connective tissue, Personal history of other diseases of the musculoskeletal system and connective tissue, Personal history of other mental and behavioral disorders, and Pure hypercholesterolemia, unspecified.    Surgical History  She has a past surgical history that includes Lumbar fusion (10/23/2013); Cervical fusion (10/23/2013); Other surgical history (2016); Hysterectomy (2014); Breast lumpectomy (2014); and  section, classic (2014).     Social History  She reports that she has never smoked. She has never used smokeless tobacco. She reports that she does not drink alcohol. No history on file for drug use.    Family History  Family History   Problem Relation Name Age of Onset    Breast cancer Paternal Grandmother          Allergies  Bactrim [sulfamethoxazole-trimethoprim], Celexa [citalopram], Darvocet a500 [propoxyphene n-acetaminophen], Hydromorphone, Methocarbamol, Morphine, Pregabalin, Statins-hmg-coa reductase inhibitors, Sulfa (sulfonamide antibiotics), Gabapentin, Meloxicam, and Penicillin    Review of Symptoms:   Constitutional: Negative for chills, diaphoresis or fever  HENT: Negative for neck swelling  Eyes:.  Negative for eye pain  Respiratory:.  Negative for cough, shortness of breath or wheezing    Cardiovascular:.  Negative for chest pain or palpitations  Gastrointestinal:.  Negative for abdominal pain,  nausea and vomiting  Genitourinary:.  Negative for urgency  Musculoskeletal:  Positive for back pain. Positive for joint pain. Denies falls within the past 3 months.  Skin: Negative for wounds or itching   Neurological: Negative for dizziness, seizures, loss of consciousness and weakness  Endo/Heme/Allergies: Does not bruise/bleed easily  Psychiatric/Behavioral: Negative for depression. The patient does not appear anxious.       PHYSICAL EXAM  Vitals signs reviewed  Constitutional:       General: Not in acute distress     Appearance: Normal appearance. Not ill-appearing.  HENT:     Head: Normocephalic and atraumatic  Eyes:     Conjunctiva/sclera: Conjunctivae normal  Cardiovascular:     Rate and Rhythm: Normal rate and regular rhythm  Pulmonary:     Effort: No respiratory distress  Abdominal:     Palpations: Abdomen is soft  Musculoskeletal: BECK  Skin:     General: Skin is warm and dry  Neurological:     General: No focal deficit present  Psychiatric:         Mood and Affect: Mood normal         Behavior: Behavior normal     Last Recorded Vitals  /82   Pulse 92   Temp 36 °C (96.8 °F) (Skin)   Resp 16   Wt 83.5 kg (184 lb)   SpO2 95%     Relevant Results  Current Outpatient Medications   Medication Instructions    ALPRAZolam (XANAX) 1 mg, oral, 3 times daily PRN    amitriptyline (Elavil) 75 mg tablet TAKE 1 TABLET (75 MG) BY MOUTH AT BEDTIME    chlorhexidine (Peridex) 0.12 % solution RINSE 15 ML'S TWICE DAILY AFTER BREAKFAST/BEFORE BEDTIME FOLLOWING BRUSHING AND FLOSSING    DULoxetine (Cymbalta) 60 mg DR capsule Take one tablet daily    glipiZIDE (Glucotrol) 10 mg tablet     lidocaine (Lidoderm) 5 % patch Use one patch every 12 hours as needed for pain    loratadine (CLARITIN) 10 mg, oral, Every 12 hours    losartan (COZAAR) 50 mg, oral, Daily    metFORMIN (GLUCOPHAGE) 1,000 mg, oral, 2 times daily (morning and late afternoon)    metoprolol tartrate (Lopressor) 25 mg tablet oral, Every 12 hours    naloxone  (NARCAN) 4 mg, nasal, As needed, May repeat every 2-3 minutes if needed, alternating nostrils, until medical assistance becomes available.    omega 3-dha-epa-fish oil (Fish OiL) 1,000 mg (120 mg-180 mg) capsule oral    omeprazole (PRILOSEC) 20 mg, oral    Ozempic 1 mg, subcutaneous    tiZANidine (ZANAFLEX) 4 mg, oral, 2 times daily PRN         MR LUMBAR SPINE WO IV CONTRAST 12/30/2022    Narrative  MRN: [id number]  Patient Name: ADAMARIS GRAVES    STUDY:  MRI L-SPINE WO;  12/30/2022 6:45 pm    INDICATION:  low back pain  M51.26: Lumbar herniated disc.    COMPARISON:  November 2015.    ACCESSION NUMBER(S):  [id number]    ORDERING CLINICIAN:  MATA TIPTON    TECHNIQUE:  The lumbar spine was studied in the sagital, axial and coronal planes  utiliing T1 and T2 weighted images.    FINDINGS:  The marrow signal and vertebral body height are normal. The conus and  sacrum are normal.  Images at each interspace reveal the following:  L1/L2  There is normal alignment and vertebral body height. The disc space  is normal. There is no evidence of canal or foraminal narrowing.  There is no evidence of bulging or herniated disc.  L2/L3  Bilateral facet hypertrophy and mild bulging disc without canal or  foraminal narrowing  L3/L4  Previous interbody fusion and posterior fixation without canal or  foraminal narrowing  L4/L5  Previous laminectomy with residual deformity of the thecal sac  unchanged from the previous exam. There is been interval development  of a midline disc herniation or focus of granulation tissue measuring  approximally 5 mm x 10 mm in size and best appreciated on  parasagittal T2 weighted image 11/19. There is been progressive  narrowing of the thecal sac at this level which is best appreciated  on axial 10/24 compared to the previous exam.  L5/S1  Bilateral facet hypertrophy and bulging intervertebral disc. No  measurable canal stenosis. Severe bilateral foraminal narrowing.    Impression  * Postoperative  changes as previously described  *Unchanged bulging discs at L4/L5 and L5/S1 with progressive canal  narrowing at L4/L5 due to a midline soft tissue structure consistent  with herniated disc fragment or evolving granulation tissue..    The examination was interpreted [facility]      MR lumbar spine wo IV contrast     Narrative  PERFORMED AT [facility] LOCATION:[place] [address]    MRN: [id number]  Patient Name: STAR BAILON  STUDY:  MRI L-SPINE WO;  12/30/2022 6:45 pm  INDICATION:  low back pain  M51.26: Lumbar herniated disc.  COMPARISON:  November 2015.  ACCESSION NUMBER(S):  [id number]  ORDERING CLINICIAN:  MATA TIPTON  TECHNIQUE:  The lumbar spine was studied in the sagital  axial and coronal planes  utiliing T1 and T2 weighted images.  FINDINGS:  The marrow signal and vertebral body height are normal. The conus and  sacrum are normal.  Images at each interspace reveal the following:  L1/L2  There is normal alignment and vertebral body height. The disc space  is normal. There is no evidence of canal or foraminal narrowing.  There is no evidence of bulging or herniated disc.  L2/L3  Bilateral facet hypertrophy and mild bulging disc without canal or  foraminal narrowing  L3/L4  Previous interbody fusion and posterior fixation without canal or  foraminal narrowing  L4/L5  Previous laminectomy with residual deformity of the thecal sac  unchanged from the previous exam. There is been interval development  of a midline disc herniation or focus of granulation tissue measuring  approximally 5 mm x 10 mm in size and best appreciated on  parasagittal T2 weighted image 11/19. There is been progressive  narrowing of the thecal sac at this level which is best appreciated  on axial 10/24 compared to the previous exam.  L5/S1  Bilateral facet hypertrophy and bulging intervertebral disc. No  measurable canal stenosis. Severe bilateral foraminal narrowing.  IMPRESSION:  * Postoperative changes as previously  described  *Unchanged bulging discs at L4/L5 and L5/S1 with progressive canal  narrowing at L4/L5 due to a midline soft tissue structure consistent  with herniated disc fragment or evolving granulation tissue..  The examination was interpreted [facility]  Electronically signed by: SIM BURGESS MD     No image results found.       1. Lumbar radiculopathy  FL pain management    FL pain management    Transforaminal    Transforaminal           ASSESSMENT/PLAN  Jazzy Napoles is a 58 y.o. female presents for Right L4 transforaminal epidural steroid injection    Patient denies any recent antibiotic use or infections, denies any blood thinner use, and denies contrast or local anesthetic allergies     Risks, benefits, alternatives discussed. All questions answered to the best of my ability. Patient agrees to proceed.      Our plan is as follows:  - Proceed with aforementioned procedure          Binta Thurston DO   Pain fellow

## 2024-07-26 DIAGNOSIS — M51.26 LUMBAR HERNIATED DISC: Primary | ICD-10-CM

## 2024-07-26 RX ORDER — HYDROCODONE BITARTRATE AND ACETAMINOPHEN 5; 325 MG/1; MG/1
1 TABLET ORAL 2 TIMES DAILY PRN
Qty: 60 TABLET | Refills: 0 | Status: SHIPPED | OUTPATIENT
Start: 2024-07-26 | End: 2024-08-25

## 2024-09-17 ENCOUNTER — OFFICE VISIT (OUTPATIENT)
Dept: PAIN MEDICINE | Facility: HOSPITAL | Age: 59
End: 2024-09-17
Payer: COMMERCIAL

## 2024-09-17 DIAGNOSIS — M54.12 CERVICAL RADICULITIS: Primary | ICD-10-CM

## 2024-09-17 DIAGNOSIS — M51.26 LUMBAR HERNIATED DISC: ICD-10-CM

## 2024-09-17 PROCEDURE — 99214 OFFICE O/P EST MOD 30 MIN: CPT | Performed by: ANESTHESIOLOGY

## 2024-09-17 PROCEDURE — 3060F POS MICROALBUMINURIA REV: CPT | Performed by: ANESTHESIOLOGY

## 2024-09-17 PROCEDURE — 4010F ACE/ARB THERAPY RXD/TAKEN: CPT | Performed by: ANESTHESIOLOGY

## 2024-09-17 RX ORDER — HYDROCODONE BITARTRATE AND ACETAMINOPHEN 5; 325 MG/1; MG/1
1 TABLET ORAL 2 TIMES DAILY PRN
Qty: 60 TABLET | Refills: 0 | Status: SHIPPED | OUTPATIENT
Start: 2024-09-17 | End: 2024-10-17

## 2024-09-17 RX ORDER — METHYLPREDNISOLONE 4 MG/1
TABLET ORAL
Qty: 21 TABLET | Refills: 0 | Status: SHIPPED | OUTPATIENT
Start: 2024-09-17 | End: 2024-09-24

## 2024-09-17 ASSESSMENT — PAIN SCALES - GENERAL: PAINLEVEL: 7

## 2024-09-17 NOTE — PROGRESS NOTES
Chief Complaint   Patient presents with    Neck Pain    Extremity Pain     Subjective   Patient ID: Jazzy Napoles is a 58 y.o. female with a past medical history of fibromyalgia, chronic neck pain, cervical spondylosis, cervical radiculitis, chronic low back pain, and lumbar spondylosis with radiculopathy, s/p L3-L4 laminectomy and fusion, who presents for follow-up for her chronic neck pain today.  She last had cervical interlaminar epidural steroid injection with right-sided bias at the C6-C7 level on 10/2020 with 100% pain relief until June 2024.  Patient states that she was doing a lot of gardening work, especially bent forward, when her pain was reaggravated.  Pain has since returned in the distribution as prior to injection.  Pain is described as sharp and achy, and travels from the neck to the bilateral shoulders and upper extremities with associated numbness and tingling.       OARRS:  No data recorded  I have personally reviewed the OARRS report for Jazzy Napoles. I have considered the risks of abuse, dependence, addiction and diversion    Is the patient prescribed a combination of a benzodiazepine and opioid?  No  Controlled Substance Agreement:  Reviewed Controlled Substance Agreement including but not limited to the benefits, risks, and alternatives to treatment with a Controlled Substance medication(s).    Last Urine Drug Screen:  Recent Results (from the past 8760 hour(s))   Confirmation Opiate/Opioid/Benzo Prescription Compliance    Collection Time: 02/26/24  1:19 PM   Result Value Ref Range    Clonazepam <25 <25 ng/mL    7-Aminoclonazepam <25 <25 ng/mL    Alprazolam 36 (H) <25 ng/mL    Alpha-Hydroxyalprazolam 66 (H) <25 ng/mL    Midazolam <25 <25 ng/mL    Alpha-Hydroxymidazolam <25 <25 ng/mL    Chlordiazepoxide <25 <25 ng/mL    Diazepam <25 <25 ng/mL    Nordiazepam <25 <25 ng/mL    Temazepam <25 <25 ng/mL    Oxazepam <25 <25 ng/mL    Lorazepam <25 <25 ng/mL    Methadone <25 <25 ng/mL     EDDP <25 <25 ng/mL    6-Acetylmorphine <25 <25 ng/mL    Codeine <50 <50 ng/mL    Hydrocodone 464 (H) <25 ng/mL    Hydromorphone <25 <25 ng/mL    Morphine  <50 <50 ng/mL    Norhydrocodone 531 (H) <25 ng/mL    Noroxycodone <25 <25 ng/mL    Oxycodone <25 <25 ng/mL    Oxymorphone <25 <25 ng/mL    Fentanyl <2.5 <2.5 ng/mL    Norfentanyl <2.5 <2.5 ng/mL    Tramadol <50 <50 ng/mL    O-Desmethyltramadol <50 <50 ng/mL    Zolpidem <25 <25 ng/mL    Zolpidem Metabolite (ZCA) <25 <25 ng/mL   Screen Opiate/Opioid/Benzo Prescription Compliance    Collection Time: 02/26/24  1:19 PM   Result Value Ref Range    Creatinine, Urine Random 119.7 20.0 - 320.0 mg/dL    Amphetamine Screen, Urine Presumptive Negative Presumptive Negative    Barbiturate Screen, Urine Presumptive Negative Presumptive Negative    Cannabinoid Screen, Urine Presumptive Negative Presumptive Negative    Cocaine Metabolite Screen, Urine Presumptive Negative Presumptive Negative    PCP Screen, Urine Presumptive Negative Presumptive Negative     Results are as expected.       Review of Systems   13-point ROS done and negative except for HPI.     Current Outpatient Medications   Medication Instructions    ALPRAZolam (XANAX) 1 mg, oral, 3 times daily PRN    amitriptyline (Elavil) 75 mg tablet TAKE 1 TABLET (75 MG) BY MOUTH AT BEDTIME    chlorhexidine (Peridex) 0.12 % solution RINSE 15 ML'S TWICE DAILY AFTER BREAKFAST/BEFORE BEDTIME FOLLOWING BRUSHING AND FLOSSING    DULoxetine (Cymbalta) 60 mg DR capsule Take one tablet daily    glipiZIDE (Glucotrol) 10 mg tablet     lidocaine (Lidoderm) 5 % patch Use one patch every 12 hours as needed for pain    loratadine (CLARITIN) 10 mg, oral, Every 12 hours    losartan (COZAAR) 50 mg, oral, Daily    metFORMIN (GLUCOPHAGE) 1,000 mg, oral, 2 times daily (morning and late afternoon)    metoprolol tartrate (Lopressor) 25 mg tablet oral, Every 12 hours    naloxone (NARCAN) 4 mg, nasal, As needed, May repeat every 2-3 minutes if needed,  alternating nostrils, until medical assistance becomes available.    omega 3-dha-epa-fish oil (Fish OiL) 1,000 mg (120 mg-180 mg) capsule oral    omeprazole (PRILOSEC) 20 mg, oral    Ozempic 1 mg, subcutaneous    tiZANidine (ZANAFLEX) 4 mg, oral, 2 times daily PRN       Past Medical History:   Diagnosis Date    Other chronic pain     Chronic pain    Other conditions influencing health status 2014    History of dyspareunia    Personal history of other diseases of the digestive system     History of esophageal reflux    Personal history of other diseases of the musculoskeletal system and connective tissue     History of arthritis    Personal history of other diseases of the musculoskeletal system and connective tissue     History of low back pain    Personal history of other diseases of the musculoskeletal system and connective tissue     History of backache    Personal history of other mental and behavioral disorders     History of depression    Pure hypercholesterolemia, unspecified     High cholesterol        Past Surgical History:   Procedure Laterality Date    BREAST LUMPECTOMY  2014    Breast Surgery Lumpectomy    CERVICAL FUSION  10/23/2013    Cervical Vertebral Fusion     SECTION, CLASSIC  2014     Section    HYSTERECTOMY  2014    Hysterectomy    LUMBAR FUSION  10/23/2013    Lumbar Vertebral Fusion    OTHER SURGICAL HISTORY  2016    Oophorectomy - Bilateral (Removal Of Both Ovaries)        Family History   Problem Relation Name Age of Onset    Breast cancer Paternal Grandmother          Allergies   Allergen Reactions    Bactrim [Sulfamethoxazole-Trimethoprim] Swelling    Celexa [Citalopram] Swelling    Darvocet A500 [Propoxyphene N-Acetaminophen] Swelling    Hydromorphone GI Upset    Methocarbamol Unknown    Morphine GI Upset    Pregabalin Swelling    Statins-Hmg-Coa Reductase Inhibitors Itching    Sulfa (Sulfonamide Antibiotics) Itching    Gabapentin Palpitations     Meloxicam Palpitations    Penicillin Palpitations        Objective     There were no vitals filed for this visit.     Physical Exam  General: NAD, well groomed, well nourished  Eyes: Non-icteric sclera, EOMI  Ears, Nose, Mouth, and Throat: External ears and nose appear to be without deformity or rash. No lesions or masses noted. Hearing is grossly intact.   Neck: Trachea midline  Respiratory: Nonlabored breathing   Cardiovascular: No peripheral edema observed  Skin: No rashes or open lesions/ulcers identified on skin.    Neck:   Palpation: Tenderness to palpation over lumbar paraspinous muscles.   Spurling's test: Positive on the right.    Neurologic:   Cranial nerves grossly intact.   Strength: 5/5 and symmetric plantar/dorsiflexion   Sensation: Normal to light touch throughout, pinprick intact throughout.  DTRs:normal and symmetric throughout      Psychiatric: Alert, orientation to person, place, and time. Cooperative.    Assessment/Plan   Jazzy Napoles is a 58 y.o. female with a history of fibromyalgia, chronic neck pain, cervical spondylosis, cervical radiculitis, chronic low back pain, and lumbar spondylosis with radiculopathy, s/p L3-L4 laminectomy and fusion, who presents for follow-up for her chronic neck pain today.  She last had cervical interlaminar epidural steroid injection with right-sided bias at the C6-C7 level on 10/2020 with 100% pain relief until June 2024.  Pain is since returned in the distribution as prior to injection.  Pain is described as sharp and achy, and travels from the neck to the bilateral shoulders and upper extremities with associated numbness and tingling.  Spurling's test reproduces the pain, right side.    Plan:  - Opioid pain medication contract reviewed.  Patient is up-to-date on her urine drug screen and controlled substance agreement.  Opioid risk tool was assessed which was low risk.  Prescription reporting system checked.  Will send a refill for Vicodin 5/325 to  her pharmacy.  Risks associate with the medication discussed in detail.  - Medrol Dosepak prescription given for her chronic neck pain with cervical radiculitis.  Side effect profile reviewed.  - Will consider repeat cervical interlaminar epidural steroid injection at the C6-C7 level. Patient is going on a trip, and will call us to schedule for procedure when she returns. The risks, benefits, and alternatives of the procedure were discussed with the patient, and her questions were answered.  Patient demonstrated understanding, and is amenable to proceeding with it.    Follow up: After procedure    The patient was invited to contact us back anytime with any questions or concerns and follow-up with us in the office as needed.     There are no diagnoses linked to this encounter.    This note was generated with the aid of dictation software, there may be typos despite my attempts at proofreading.     Carmen Sandoval MD  Chronic Pain Medicine Fellow  [facility]

## 2024-09-26 ENCOUNTER — LAB (OUTPATIENT)
Dept: LAB | Facility: LAB | Age: 59
End: 2024-09-26
Payer: COMMERCIAL

## 2024-11-09 NOTE — PROGRESS NOTES
Subjective   Patient ID: Jazzy Napoles is a 59 y.o. female who presents for New Patient Visit (New patient, self referred. Previously seen by Rheumatologist Dr. Ailin Wilson. History of Fibromyalgia, and Osteoarthritis.).    HPI  Fu telemed appt   Fibromyalgia  Chronic pain  OA   Post Laminectomy syndrome     Seen by multiple  Rheum  Dr. Edouard 2018  [place]  Dr. Christina at     And 2024, April by Dr. Wilson , fellow with Dr. Quiroga   Jazzy Napoles is a 57yo F w/a history of chronic pain 2/2 polyarticular OA, lumbar and cervicaly spondylosis with post-laminectomy syndrome, and fibromyalgia presenting for follow-up. She has no autoimmune rheumatic disease and is established with pain mgmt for OA, post-laminectomy syndrome, and fibromyalgia. Refills provided for Elavil, Cymbalta, Zanaflex, and lidocaine patches today. Pain mgmt is the prescriber of her opiate pain medication. Discussed conservative measures including exercise and sleep hygiene. She prefers to continue following with Rheumatology as well as PCP and pain mgmt for her conditions.     FMS  Now joint swelling hands feet  Shoulder blade and tingling R arm using TENS unit  New 1.5 ago   No PT   Has Lidocaine patch       ROS      Current Outpatient Medications   Medication Sig Dispense Refill    ALPRAZolam (Xanax) 1 mg tablet Take 1 tablet (1 mg) by mouth 3 times a day as needed for anxiety. 30 tablet 0    chlorhexidine (Peridex) 0.12 % solution RINSE 15 ML'S TWICE DAILY AFTER BREAKFAST/BEFORE BEDTIME FOLLOWING BRUSHING AND FLOSSING (Patient taking differently: if needed.)      glipiZIDE (Glucotrol) 10 mg tablet       loratadine (Claritin) 10 mg tablet Take 1 tablet (10 mg) by mouth every 12 hours. (Patient taking differently: Take 1 tablet (10 mg) by mouth if needed.)      metFORMIN (Glucophage) 1,000 mg tablet Take 1 tablet (1,000 mg) by mouth 2 times daily (morning and late afternoon).      metoprolol tartrate (Lopressor) 25 mg tablet Take by  mouth every 12 hours.      naloxone (Narcan) 4 mg/0.1 mL nasal spray Administer 1 spray (4 mg) into affected nostril(s) if needed for opioid reversal. May repeat every 2-3 minutes if needed, alternating nostrils, until medical assistance becomes available. 2 each 0    omega 3-dha-epa-fish oil (Fish OiL) 1,000 mg (120 mg-180 mg) capsule Take by mouth.      omeprazole (PriLOSEC) 20 mg DR capsule Take 1 capsule (20 mg) by mouth.      Ozempic 1 mg/dose (4 mg/3 mL) pen injector Inject 1 mg under the skin.      amitriptyline (Elavil) 75 mg tablet TAKE 1 TABLET (75 MG) BY MOUTH AT BEDTIME 90 tablet 3    DULoxetine (Cymbalta) 60 mg DR capsule Take one tablet daily 90 capsule 3    lidocaine (Lidoderm) 5 % patch Use one patch every 12 hours as needed for pain 30 patch 5    tiZANidine (Zanaflex) 4 mg capsule Take 1 capsule (4 mg) by mouth 2 times a day as needed for muscle spasms. 180 capsule 1     No current facility-administered medications for this visit.         has a past medical history of Other chronic pain, Other conditions influencing health status (2014), Personal history of other diseases of the digestive system, Personal history of other diseases of the musculoskeletal system and connective tissue, Personal history of other diseases of the musculoskeletal system and connective tissue, Personal history of other diseases of the musculoskeletal system and connective tissue, Personal history of other mental and behavioral disorders, and Pure hypercholesterolemia, unspecified.   Past Surgical History:   Procedure Laterality Date    BREAST LUMPECTOMY  2014    Breast Surgery Lumpectomy    CERVICAL FUSION  10/23/2013    Cervical Vertebral Fusion     SECTION, CLASSIC  2014     Section    HYSTERECTOMY  2014    Hysterectomy    LUMBAR FUSION  10/23/2013    Lumbar Vertebral Fusion    OTHER SURGICAL HISTORY  2016    Oophorectomy - Bilateral (Removal Of Both Ovaries)      Social  History     Tobacco Use    Smoking status: Never    Smokeless tobacco: Never   Substance Use Topics    Alcohol use: Never      family history includes Breast cancer in her paternal grandmother.  Pain Management Panel  More data exists         Latest Ref Rng & Units 2/26/2024 3/14/2023   Pain Management Panel   Amphetamine Screen, Urine Presumptive Negative Presumptive Negative  PRESUMPTIVE NEGATIVE    Barbiturate Screen, Urine Presumptive Negative Presumptive Negative  PRESUMPTIVE NEGATIVE    Codeine IgE <50 ng/mL <50  <50    Fentanyl Screen, Urine NEGATIVE - PRESUMPTIVE NEGATIVE    Hydromorphone Urine <25 ng/mL <25  <25    Methadone Screen, Urine NEGATIVE - PRESUMPTIVE NEGATIVE    Morphine  <50 ng/mL <50  <50       Details                    Vitals:    11/13/24 0819   SpO2: 100%     Lab Results   Component Value Date    RF <10 02/26/2019    SEDRATE 11 02/26/2019    CRP 1.46 (A) 02/26/2019       PHYSICAL EXAM      NODES -  HEART  LUNGS  ABDOMEN   VASCULAR  NEURO DTR normal prox distal st normal   SKIN-  JOINTS feet normal no synovitis  Shoulder and C spine normal  Hands some Heb and belle nodes and bony swelling IP thumb l and pip and dip allison  No active synovitis     PLAN  Diagnoses and all orders for this visit:  Cervical radiculitis  -     Referral to Physical Therapy; Future  Fibromyalgia  -     amitriptyline (Elavil) 75 mg tablet; TAKE 1 TABLET (75 MG) BY MOUTH AT BEDTIME  -     DULoxetine (Cymbalta) 60 mg DR capsule; Take one tablet daily  -     lidocaine (Lidoderm) 5 % patch; Use one patch every 12 hours as needed for pain  -     tiZANidine (Zanaflex) 4 mg capsule; Take 1 capsule (4 mg) by mouth 2 times a day as needed for muscle spasms.  Polyarticular osteoarthritis  -     DULoxetine (Cymbalta) 60 mg DR capsule; Take one tablet daily  -     lidocaine (Lidoderm) 5 % patch; Use one patch every 12 hours as needed for pain  Post laminectomy syndrome  -     lidocaine (Lidoderm) 5 % patch; Use one patch every 12  hours as needed for pain  Primary osteoarthritis involving multiple joints  Spondylosis of cervical region without myelopathy or radiculopathy  Spondylosis without myelopathy or radiculopathy, lumbar region  Primary osteoarthritis of both hands    Longstanding pain secondary to osteoarthritis especially of both hands    Also has a prior diagnosis of fibromyalgia which appears to be minor    History of cervical and lumbar radiculopathies with cervical fusion done in the past    At present she is having pain in the right shoulder blade with tingling down the right upper arm which is most likely secondary to cervical radicular.  Last week she was lifting a very heavy table with her  which may have created this sense of tingling in the right arm with origin at the cervical nerve root.    She is otherwise maintained on her present medication and include amitriptyline, duloxetine, lidocaine patch and tizanidine all of these were refilled.    I also gave her a prescription for physical therapy which she will take to her outside provider for the cervical radiculitis.    I will see her back in 1 year unless she needs me prior to that I will see her sooner.

## 2024-11-13 ENCOUNTER — APPOINTMENT (OUTPATIENT)
Dept: RHEUMATOLOGY | Facility: CLINIC | Age: 59
End: 2024-11-13
Payer: COMMERCIAL

## 2024-11-13 VITALS — WEIGHT: 171 LBS | HEIGHT: 64 IN | BODY MASS INDEX: 29.19 KG/M2 | OXYGEN SATURATION: 100 %

## 2024-11-13 DIAGNOSIS — M47.816 SPONDYLOSIS WITHOUT MYELOPATHY OR RADICULOPATHY, LUMBAR REGION: ICD-10-CM

## 2024-11-13 DIAGNOSIS — M96.1 POST LAMINECTOMY SYNDROME: ICD-10-CM

## 2024-11-13 DIAGNOSIS — M15.9 POLYARTICULAR OSTEOARTHRITIS: ICD-10-CM

## 2024-11-13 DIAGNOSIS — M19.042 PRIMARY OSTEOARTHRITIS OF BOTH HANDS: ICD-10-CM

## 2024-11-13 DIAGNOSIS — M54.12 CERVICAL RADICULITIS: Primary | ICD-10-CM

## 2024-11-13 DIAGNOSIS — M47.812 SPONDYLOSIS OF CERVICAL REGION WITHOUT MYELOPATHY OR RADICULOPATHY: ICD-10-CM

## 2024-11-13 DIAGNOSIS — M15.0 PRIMARY OSTEOARTHRITIS INVOLVING MULTIPLE JOINTS: ICD-10-CM

## 2024-11-13 DIAGNOSIS — M19.041 PRIMARY OSTEOARTHRITIS OF BOTH HANDS: ICD-10-CM

## 2024-11-13 DIAGNOSIS — M79.7 FIBROMYALGIA: ICD-10-CM

## 2024-11-13 PROCEDURE — 3008F BODY MASS INDEX DOCD: CPT | Performed by: INTERNAL MEDICINE

## 2024-11-13 PROCEDURE — 3060F POS MICROALBUMINURIA REV: CPT | Performed by: INTERNAL MEDICINE

## 2024-11-13 PROCEDURE — 99204 OFFICE O/P NEW MOD 45 MIN: CPT | Performed by: INTERNAL MEDICINE

## 2024-11-13 RX ORDER — LIDOCAINE 50 MG/G
PATCH TOPICAL
Qty: 30 PATCH | Refills: 5 | Status: SHIPPED | OUTPATIENT
Start: 2024-11-13

## 2024-11-13 RX ORDER — TIZANIDINE HYDROCHLORIDE 4 MG/1
4 CAPSULE, GELATIN COATED ORAL 2 TIMES DAILY PRN
Qty: 180 CAPSULE | Refills: 1 | Status: SHIPPED | OUTPATIENT
Start: 2024-11-13 | End: 2025-05-12

## 2024-11-13 RX ORDER — AMITRIPTYLINE HYDROCHLORIDE 75 MG/1
TABLET ORAL
Qty: 90 TABLET | Refills: 3 | Status: SHIPPED | OUTPATIENT
Start: 2024-11-13

## 2024-11-13 RX ORDER — DULOXETIN HYDROCHLORIDE 60 MG/1
CAPSULE, DELAYED RELEASE ORAL
Qty: 90 CAPSULE | Refills: 3 | Status: SHIPPED | OUTPATIENT
Start: 2024-11-13

## 2024-11-13 ASSESSMENT — PAIN SCALES - GENERAL: PAINLEVEL_OUTOF10: 5

## 2024-12-18 ENCOUNTER — TELEMEDICINE (OUTPATIENT)
Dept: PAIN MEDICINE | Facility: CLINIC | Age: 59
End: 2024-12-18
Payer: COMMERCIAL

## 2024-12-18 DIAGNOSIS — M96.1 FAILED BACK SYNDROME, CERVICAL: ICD-10-CM

## 2024-12-18 DIAGNOSIS — M54.16 LUMBAR RADICULOPATHY: ICD-10-CM

## 2024-12-18 DIAGNOSIS — Z79.899 ENCOUNTER FOR LONG-TERM (CURRENT) USE OF HIGH-RISK MEDICATION: Primary | ICD-10-CM

## 2024-12-18 DIAGNOSIS — M96.1 POSTLAMINECTOMY SYNDROME OF LUMBAR REGION: ICD-10-CM

## 2024-12-18 DIAGNOSIS — M54.12 CERVICAL RADICULITIS: ICD-10-CM

## 2024-12-18 PROCEDURE — 99213 OFFICE O/P EST LOW 20 MIN: CPT | Performed by: NURSE PRACTITIONER

## 2024-12-18 PROCEDURE — 1036F TOBACCO NON-USER: CPT | Performed by: NURSE PRACTITIONER

## 2024-12-18 PROCEDURE — 3060F POS MICROALBUMINURIA REV: CPT | Performed by: NURSE PRACTITIONER

## 2024-12-18 RX ORDER — HYDROCODONE BITARTRATE AND ACETAMINOPHEN 5; 325 MG/1; MG/1
1 TABLET ORAL 2 TIMES DAILY PRN
Qty: 60 TABLET | Refills: 0 | Status: SHIPPED | OUTPATIENT
Start: 2024-12-18 | End: 2025-01-17

## 2024-12-18 NOTE — PROGRESS NOTES
Chief Complaint/HPI:  Telehealth visit today for medication review and refill. She has chronic neck pain and low back pain related to cervical and lumbar surgeries.  Overall pain is rated 8/10 today. She had several procedures in the past with varying benefit. She take hydrocodone-acetaminophen 5/325 mg very prn. This is effective for her and she denies adverse side effects.    OARRS:  Laurence Stone, APRN-CNP on 12/18/2024  3:47 PM  I have personally reviewed the OARRS report for Jazzy Napoles. I have considered the risks of abuse, dependence, addiction and diversion    Is the patient prescribed a combination of a benzodiazepine and opioid?  Yes, I feel it is clincially indicated to continue the medication and have discussed with the patient risks/benefits/alternatives.    Last Urine Drug Screen / ordered today: Yes  Recent Results (from the past 8760 hours)   Confirmation Opiate/Opioid/Benzo Prescription Compliance    Collection Time: 02/26/24  1:19 PM   Result Value Ref Range    Clonazepam <25 <25 ng/mL    7-Aminoclonazepam <25 <25 ng/mL    Alprazolam 36 (H) <25 ng/mL    Alpha-Hydroxyalprazolam 66 (H) <25 ng/mL    Midazolam <25 <25 ng/mL    Alpha-Hydroxymidazolam <25 <25 ng/mL    Chlordiazepoxide <25 <25 ng/mL    Diazepam <25 <25 ng/mL    Nordiazepam <25 <25 ng/mL    Temazepam <25 <25 ng/mL    Oxazepam <25 <25 ng/mL    Lorazepam <25 <25 ng/mL    Methadone <25 <25 ng/mL    EDDP <25 <25 ng/mL    6-Acetylmorphine <25 <25 ng/mL    Codeine <50 <50 ng/mL    Hydrocodone 464 (H) <25 ng/mL    Hydromorphone <25 <25 ng/mL    Morphine  <50 <50 ng/mL    Norhydrocodone 531 (H) <25 ng/mL    Noroxycodone <25 <25 ng/mL    Oxycodone <25 <25 ng/mL    Oxymorphone <25 <25 ng/mL    Fentanyl <2.5 <2.5 ng/mL    Norfentanyl <2.5 <2.5 ng/mL    Tramadol <50 <50 ng/mL    O-Desmethyltramadol <50 <50 ng/mL    Zolpidem <25 <25 ng/mL    Zolpidem Metabolite (ZCA) <25 <25 ng/mL   Screen Opiate/Opioid/Benzo Prescription Compliance     Collection Time: 02/26/24  1:19 PM   Result Value Ref Range    Creatinine, Urine Random 119.7 20.0 - 320.0 mg/dL    Amphetamine Screen, Urine Presumptive Negative Presumptive Negative    Barbiturate Screen, Urine Presumptive Negative Presumptive Negative    Cannabinoid Screen, Urine Presumptive Negative Presumptive Negative    Cocaine Metabolite Screen, Urine Presumptive Negative Presumptive Negative    PCP Screen, Urine Presumptive Negative Presumptive Negative     Results are as expected.     Controlled Substance Agreement:  Date of the Last Agreement: 2/2024  Reviewed Controlled Substance Agreement including but not limited to the benefits, risks, and alternatives to treatment with a Controlled Substance medication(s).    ROS otherwise negative aside from what was mentioned above in HPI.      Patient Active Problem List   Diagnosis    Vitamin D deficiency    Type 2 diabetes mellitus without complication (Multi)    TMJ disease    Osteoarthritis of multiple joints    Gastritis    Spondylosis without myelopathy or radiculopathy, lumbar region    Spondylosis of cervical region without myelopathy or radiculopathy    Osteoarthritis, hand    Peripheral neuropathic pain    Moderate episode of recurrent major depressive disorder    Lumbar herniated disc    Hypertension, benign    Hypercholesterolemia    Laryngopharyngeal reflux (LPR)    Generalized anxiety disorder    Fibromyalgia    Diabetes mellitus (Multi)     Past Medical History:   Diagnosis Date    Other chronic pain     Chronic pain    Other conditions influencing health status 11/03/2014    History of dyspareunia    Personal history of other diseases of the digestive system     History of esophageal reflux    Personal history of other diseases of the musculoskeletal system and connective tissue     History of arthritis    Personal history of other diseases of the musculoskeletal system and connective tissue     History of low back pain    Personal history of other  diseases of the musculoskeletal system and connective tissue     History of backache    Personal history of other mental and behavioral disorders     History of depression    Pure hypercholesterolemia, unspecified     High cholesterol     Past Surgical History:   Procedure Laterality Date    BREAST LUMPECTOMY  2014    Breast Surgery Lumpectomy    CERVICAL FUSION  10/23/2013    Cervical Vertebral Fusion     SECTION, CLASSIC  2014     Section    HYSTERECTOMY  2014    Hysterectomy    LUMBAR FUSION  10/23/2013    Lumbar Vertebral Fusion    OTHER SURGICAL HISTORY  2016    Oophorectomy - Bilateral (Removal Of Both Ovaries)     Family History   Problem Relation Name Age of Onset    Breast cancer Paternal Grandmother       Social History     Tobacco Use    Smoking status: Never    Smokeless tobacco: Never   Substance Use Topics    Alcohol use: Never         ALLERGIES  Allergies   Allergen Reactions    Bactrim [Sulfamethoxazole-Trimethoprim] Swelling    Celexa [Citalopram] Swelling    Darvocet A500 [Propoxyphene N-Acetaminophen] Swelling    Hydromorphone GI Upset    Methocarbamol Unknown    Morphine GI Upset    Pregabalin Swelling    Statins-Hmg-Coa Reductase Inhibitors Itching    Sulfa (Sulfonamide Antibiotics) Itching    Gabapentin Palpitations    Meloxicam Palpitations    Penicillin Palpitations         MEDICATIONS  Current Outpatient Medications   Medication Sig Dispense Refill    ALPRAZolam (Xanax) 1 mg tablet Take 1 tablet (1 mg) by mouth 3 times a day as needed for anxiety. 30 tablet 0    amitriptyline (Elavil) 75 mg tablet TAKE 1 TABLET (75 MG) BY MOUTH AT BEDTIME 90 tablet 3    chlorhexidine (Peridex) 0.12 % solution RINSE 15 ML'S TWICE DAILY AFTER BREAKFAST/BEFORE BEDTIME FOLLOWING BRUSHING AND FLOSSING (Patient taking differently: if needed.)      DULoxetine (Cymbalta) 60 mg DR capsule Take one tablet daily 90 capsule 3    glipiZIDE (Glucotrol) 10 mg tablet        HYDROcodone-acetaminophen (Norco) 5-325 mg tablet Take 1 tablet by mouth 2 times a day as needed for severe pain (7 - 10). 60 tablet 0    lidocaine (Lidoderm) 5 % patch Use one patch every 12 hours as needed for pain 30 patch 5    loratadine (Claritin) 10 mg tablet Take 1 tablet (10 mg) by mouth every 12 hours. (Patient taking differently: Take 1 tablet (10 mg) by mouth if needed.)      metFORMIN (Glucophage) 1,000 mg tablet Take 1 tablet (1,000 mg) by mouth 2 times daily (morning and late afternoon).      metoprolol tartrate (Lopressor) 25 mg tablet Take by mouth every 12 hours.      naloxone (Narcan) 4 mg/0.1 mL nasal spray Administer 1 spray (4 mg) into affected nostril(s) if needed for opioid reversal. May repeat every 2-3 minutes if needed, alternating nostrils, until medical assistance becomes available. 2 each 0    omega 3-dha-epa-fish oil (Fish OiL) 1,000 mg (120 mg-180 mg) capsule Take by mouth.      omeprazole (PriLOSEC) 20 mg DR capsule Take 1 capsule (20 mg) by mouth.      Ozempic 1 mg/dose (4 mg/3 mL) pen injector Inject 1 mg under the skin.      tiZANidine (Zanaflex) 4 mg capsule Take 1 capsule (4 mg) by mouth 2 times a day as needed for muscle spasms. 180 capsule 1     No current facility-administered medications for this visit.     PHYSICAL EXAM  Gen: Alert, NAD  Respiratory: Non labored breathing  Psych: Appropriate, cooperative    ASSESSMENT/PLAN  Problem List Items Addressed This Visit    None  Visit Diagnoses       Encounter for long-term (current) use of high-risk medication    -  Primary    Cervical radiculitis        Relevant Medications    HYDROcodone-acetaminophen (Norco) 5-325 mg tablet    Lumbar radiculopathy        Relevant Medications    HYDROcodone-acetaminophen (Norco) 5-325 mg tablet    Postlaminectomy syndrome of lumbar region        Relevant Medications    HYDROcodone-acetaminophen (Norco) 5-325 mg tablet    Failed back syndrome, cervical        Relevant Medications     HYDROcodone-acetaminophen (Norco) 5-325 mg tablet          Agreed to continue plan of care as established with Dr. Ramires.   -UDS and CSA are up to date  -Update CSA and UDS at next visit  -Has current valid script for Narcan    The patient was counseled on the appropriate use of the opioid medication, its potential dangers and the responsibilities that go along with being prescribed opioid medications including safe storage, use and disposal of the medication. Patient was also counseled on the adverse effects of long-term opioid use including hyperalgesia, tolerance, decreased immune function, and changes to the body's natural hormones.  The patient also understands the potential risks for respiratory depression especially if the medication is combined with other central nervous system depressants such as benzodiazepines or alcohol.    Laurence Stone, APRN-CNP

## 2025-03-18 ENCOUNTER — OFFICE VISIT (OUTPATIENT)
Dept: PAIN MEDICINE | Facility: HOSPITAL | Age: 60
End: 2025-03-18
Payer: COMMERCIAL

## 2025-03-18 DIAGNOSIS — Z79.891 ENCOUNTER FOR LONG-TERM OPIATE ANALGESIC USE: ICD-10-CM

## 2025-03-18 PROCEDURE — G2211 COMPLEX E/M VISIT ADD ON: HCPCS | Performed by: NURSE PRACTITIONER

## 2025-03-18 PROCEDURE — 99213 OFFICE O/P EST LOW 20 MIN: CPT | Performed by: NURSE PRACTITIONER

## 2025-03-18 PROCEDURE — 1036F TOBACCO NON-USER: CPT | Performed by: NURSE PRACTITIONER

## 2025-03-18 NOTE — PROGRESS NOTES
Jazzy Napoles follows up for interval reevaluation of chronic neck and low back pain.  She endorses a new pain in mid back extending into the chest.    OARRS:  Laurence Stone, APRN-CNP on 3/18/2025 10:10 AM  I have personally reviewed the OARRS report for Jazzy Napoles. I have considered the risks of abuse, dependence, addiction and diversion    Is the patient prescribed a combination of a benzodiazepine and opioid?  Yes, I feel it is clincially indicated to continue the medication and have discussed with the patient risks/benefits/alternatives.    Last Urine Drug Screen / ordered today: Yes  No results found for this or any previous visit (from the past 8760 hours).  Results are as expected.     Controlled Substance Agreement:  Date of the Last Agreement: yes  Reviewed Controlled Substance Agreement including but not limited to the benefits, risks, and alternatives to treatment with a Controlled Substance medication(s).    ROS otherwise negative aside from what was mentioned above in HPI.      Patient Active Problem List   Diagnosis    Vitamin D deficiency    Type 2 diabetes mellitus without complication (Multi)    TMJ disease    Osteoarthritis of multiple joints    Gastritis    Spondylosis without myelopathy or radiculopathy, lumbar region    Spondylosis of cervical region without myelopathy or radiculopathy    Osteoarthritis, hand    Peripheral neuropathic pain    Moderate episode of recurrent major depressive disorder    Lumbar herniated disc    Hypertension, benign    Hypercholesterolemia    Laryngopharyngeal reflux (LPR)    Generalized anxiety disorder    Fibromyalgia    Diabetes mellitus (Multi)     Past Medical History:   Diagnosis Date    Other chronic pain     Chronic pain    Other conditions influencing health status 11/03/2014    History of dyspareunia    Personal history of other diseases of the digestive system     History of esophageal reflux    Personal history of other diseases of the  musculoskeletal system and connective tissue     History of arthritis    Personal history of other diseases of the musculoskeletal system and connective tissue     History of low back pain    Personal history of other diseases of the musculoskeletal system and connective tissue     History of backache    Personal history of other mental and behavioral disorders     History of depression    Pure hypercholesterolemia, unspecified     High cholesterol     Past Surgical History:   Procedure Laterality Date    BREAST LUMPECTOMY  2014    Breast Surgery Lumpectomy    CERVICAL FUSION  10/23/2013    Cervical Vertebral Fusion     SECTION, CLASSIC  2014     Section    HYSTERECTOMY  2014    Hysterectomy    LUMBAR FUSION  10/23/2013    Lumbar Vertebral Fusion    OTHER SURGICAL HISTORY  2016    Oophorectomy - Bilateral (Removal Of Both Ovaries)     Family History   Problem Relation Name Age of Onset    Breast cancer Paternal Grandmother       Social History     Tobacco Use    Smoking status: Never    Smokeless tobacco: Never   Substance Use Topics    Alcohol use: Never         ALLERGIES  Allergies   Allergen Reactions    Bactrim [Sulfamethoxazole-Trimethoprim] Swelling    Celexa [Citalopram] Swelling    Darvocet A500 [Propoxyphene N-Acetaminophen] Swelling    Hydromorphone GI Upset    Methocarbamol Unknown    Morphine GI Upset    Pregabalin Swelling    Statins-Hmg-Coa Reductase Inhibitors Itching    Sulfa (Sulfonamide Antibiotics) Itching    Gabapentin Palpitations    Meloxicam Palpitations    Penicillin Palpitations         MEDICATIONS  Current Outpatient Medications   Medication Sig Dispense Refill    ALPRAZolam (Xanax) 1 mg tablet Take 1 tablet (1 mg) by mouth 3 times a day as needed for anxiety. 30 tablet 0    amitriptyline (Elavil) 75 mg tablet TAKE 1 TABLET (75 MG) BY MOUTH AT BEDTIME 90 tablet 3    chlorhexidine (Peridex) 0.12 % solution RINSE 15 ML'S TWICE DAILY AFTER  BREAKFAST/BEFORE BEDTIME FOLLOWING BRUSHING AND FLOSSING (Patient taking differently: if needed.)      DULoxetine (Cymbalta) 60 mg DR capsule Take one tablet daily 90 capsule 3    glipiZIDE (Glucotrol) 10 mg tablet       HYDROcodone-acetaminophen (Norco) 5-325 mg tablet Take 1 tablet by mouth 2 times a day as needed for severe pain (7 - 10). 60 tablet 0    lidocaine (Lidoderm) 5 % patch Use one patch every 12 hours as needed for pain 30 patch 5    loratadine (Claritin) 10 mg tablet Take 1 tablet (10 mg) by mouth every 12 hours. (Patient taking differently: Take 1 tablet (10 mg) by mouth if needed.)      metFORMIN (Glucophage) 1,000 mg tablet Take 1 tablet (1,000 mg) by mouth 2 times daily (morning and late afternoon).      metoprolol tartrate (Lopressor) 25 mg tablet Take by mouth every 12 hours.      naloxone (Narcan) 4 mg/0.1 mL nasal spray Administer 1 spray (4 mg) into affected nostril(s) if needed for opioid reversal. May repeat every 2-3 minutes if needed, alternating nostrils, until medical assistance becomes available. 2 each 0    omega 3-dha-epa-fish oil (Fish OiL) 1,000 mg (120 mg-180 mg) capsule Take by mouth.      omeprazole (PriLOSEC) 20 mg DR capsule Take 1 capsule (20 mg) by mouth.      Ozempic 1 mg/dose (4 mg/3 mL) pen injector Inject 1 mg under the skin.      tiZANidine (Zanaflex) 4 mg capsule Take 1 capsule (4 mg) by mouth 2 times a day as needed for muscle spasms. 180 capsule 1     No current facility-administered medications for this visit.         For continuity:   Given the patient's report of reduced pain and improved functional ability without adverse effects, it is reasonable to treat with narcotic medications. The terms of the opioid agreement as well as the potential risks and adverse effects of the patient's medication regimen were discussed in detail. This includes if applicable due to dosage of medication permission to discuss and coordinate care with other treatment providers relevant  to the patients condition. The patient verbalized understanding.      Risks and side effects of chronic opioid therapy including but not limited to tolerance, dependence, constipation, hyperalgesia, cognitive side effects, addiction and possible death due to overuse and or misuse were discussed. I also discussed that such medications when co-administered with other sedative agents including but not limited to alcohol, benzodiazepines, sedative hypnotics and illegal drugs could pose life threatening consequences including death. I also explained the impact that the administration of such medication has on a patient with obstructive sleep apnea and continued recommendations for use of apnea devices if ordered are prescribed by other physicians. In order to effectively and safely treat the pain, I also emphasized the importance of compliance with the treatment plan, as well as compliance with the terms of the opioid agreement, which was reviewed in detail. I explained the importance of being responsible with the medications and to take these only as prescribed, never in excess and never for reasons other than pain reduction. The patient was counseled on keeping the medications safe and locked away from children and other adults as well as disposal methods and options. The patient understood the risks and instructions.      I also discussed with the patient in detail that based on the clinical response to the opioid medications and improvements of activities of daily living, sleep, and work performance in light of compliance with the treatment plan we can continue this form of therapy for the above chronic pain. The goal and rationale used for current treatment with chronic opioid medication is to control the pain and alleviate disability induced by the chronic pain condition noted above after failures of other non-opioid and nonpharmacological modalities to treat the chronic pain and the symptoms associated with have  failed. The patient understood the goals in terms of the above treatment plan and had no further questions prior to leaving the office today.      Of note, the above-mentioned diagnoses/conditions and expected fluctuating nature of pain, and pain characteristic changes may lead to prolonged functional impairment requiring frequent and multiple reassessments with continued high level medical decision making. As noted, medication and medication management may require opiate therapy in excess of a routine less than 30 day medication requirement. The patient may require daily opiate therapy necessitating month-long prescription medication as noted above in order to perform activities of daily living and achieve acceptable quality of life with respect to their chronic pain condition for the foreseeable future. We monitor our patient's carefully through drug monitoring, medication counts, urine drug testing specific to their medication as well as a myriad of other substances and with frequent follow-ups with interval reassement of the chronic pain condition, its pathophysiology and prognosis.      The level of clinical decision making at this office visit is high due to high risks and complications including mortality and morbidity related to acute and chronic pain with respects to life, bodily function, and treatment. Risks and clinical decisions with respect to under treatment, failure to maintain adequate treatment, and/or overtreatment complications and outcomes were discussed with the patient with respect to their chronic pain conditions, interventional therapies, as well as the use of various medications including possible controlled/dangerous medications. The amount and complexity of reviewed data at this in subsequent office visits is high given patient's fluctuating clinical presentation, laboratory and radiographic reports, prescription monitoring program data, and medication history as well as other relevant  data as noted above. Pertinent negatives and positives data was used in consideration for the above-mentioned high complexity.       Given the patient's total MED, general use of daily opiates, or other coadministered medications in various classes the patient was offered a prescription for Narcan. I instructed the patient that it is important that patient fill this medication in order to demonstrate understanding of the gravity of possible side effects including respiratory depression and risk of overdose of this opiate load or medication combination. As such patient will be required to bring Narcan prescription to follow-up appointments as part of compliance with continued opiate care.      With respect to opiate induced constipation I discussed multiple ways to combat this problem including staying hydrated and taking over-the-counter medications such as Dulcolax, Miralax and Senna. If these treatments are not effective we could consider such medications as Amitiza, Linzess and Movantik.      Physical Exam  Constitutional:       Appearance: Normal appearance. No acte distress.  HENT:      Head: Normocephalic and atraumatic.   Eyes:      Conjunctiva/sclera: Conjunctivae normal.   Cardiovascular:      Pulses: Normal pulses.   Pulmonary:      Effort: Pulmonary effort is normal. No respiratory distress.   Musculoskeletal:      Right lower leg: No edema.      Left lower leg: No edema.   Skin:     General: Skin is warm and dry.   Neurological:      Mental Status: Alert and oriented to person, place, and time.      Cranial Nerves: No cranial nerve deficit.      Motor: No weakness.      Gait: Gait normal.   Psychiatric:         Behavior: Behavior normal.       Plan  Instructed pt. To present to ER to have new pain complaint evaluated  -Continue Vicodin as currently prescribed  -UDS and CSA will be updated next visit  -RTC 3 mos or as needed    KYE Deshpande-CNP

## 2025-03-19 DIAGNOSIS — M96.1 FAILED BACK SYNDROME, CERVICAL: ICD-10-CM

## 2025-03-19 DIAGNOSIS — M96.1 POSTLAMINECTOMY SYNDROME OF LUMBAR REGION: ICD-10-CM

## 2025-03-19 DIAGNOSIS — M54.16 LUMBAR RADICULOPATHY: ICD-10-CM

## 2025-03-19 DIAGNOSIS — M54.12 CERVICAL RADICULITIS: ICD-10-CM

## 2025-03-19 RX ORDER — HYDROCODONE BITARTRATE AND ACETAMINOPHEN 5; 325 MG/1; MG/1
1 TABLET ORAL 2 TIMES DAILY PRN
Qty: 60 TABLET | Refills: 0 | Status: SHIPPED | OUTPATIENT
Start: 2025-03-19 | End: 2025-04-18

## 2025-03-27 NOTE — PROGRESS NOTES
Subjective   Patient ID: Jazzy Napoles is a 59 y.o. female who presents for Follow-up ([facility]UV- bilateral hand pain. Oxycodone last refill 3/19/25, last contract 3/18/25, last drug screen 3/18/25, last visit 11/13/24).    HPI  Fu telemed appt Last November 2024    Fall March 4, on butt, lost balance  One week later pain  And getting better and shoulder blade better  To ER and then pain mgment  Pain patches needs Prior Auth   Pain ulnar ligament tendon   Had normal lab [facility] and [facility]     She will take tylenol never tried     Fibromyalgia  Chronic pain  OA   Post Laminectomy syndrome     Seen by multiple  [facility]  Dr. Edouard 2018  [place]  Dr. Christina at     And 2024, April by Dr. Wilson , fellow with Dr. Quiroga   Jazzy Napoles is a 57yo F w/a history of chronic pain 2/2 polyarticular OA, lumbar and cervicaly spondylosis with post-laminectomy syndrome, and fibromyalgia presenting for follow-up. She has no autoimmune rheumatic disease and is established with pain mgmt for OA, post-laminectomy syndrome, and fibromyalgia. Refills provided for Elavil, Cymbalta, Zanaflex, and lidocaine patches today. Pain [facility] is the prescriber of her opiate pain medication. Discussed conservative measures including exercise and sleep hygiene. She prefers to continue following with Rheumatology as well as PCP and pain mgmt for her conditions.     FMS  Now joint swelling hands feet  Shoulder blade and tingling R arm using TENS unit  New 1.5 ago   No PT   Has Lidocaine patch       ROS      Current Outpatient Medications   Medication Sig Dispense Refill    ALPRAZolam (Xanax) 1 mg tablet Take 1 tablet (1 mg) by mouth 3 times a day as needed for anxiety. 30 tablet 0    chlorhexidine (Peridex) 0.12 % solution RINSE 15 ML'S TWICE DAILY AFTER BREAKFAST/BEFORE BEDTIME FOLLOWING BRUSHING AND FLOSSING      glipiZIDE (Glucotrol) 10 mg tablet       HYDROcodone-acetaminophen (Norco) 5-325 mg tablet Take 1 tablet by mouth 2 times a day as needed  for severe pain (7 - 10). 60 tablet 0    lidocaine (Lidoderm) 5 % patch Use one patch every 12 hours as needed for pain 30 patch 5    loratadine (Claritin) 10 mg tablet Take 1 tablet (10 mg) by mouth every 12 hours.      metFORMIN (Glucophage) 1,000 mg tablet Take 1 tablet (1,000 mg) by mouth 2 times daily (morning and late afternoon).      metoprolol tartrate (Lopressor) 25 mg tablet Take by mouth every 12 hours.      naloxone (Narcan) 4 mg/0.1 mL nasal spray Administer 1 spray (4 mg) into affected nostril(s) if needed for opioid reversal. May repeat every 2-3 minutes if needed, alternating nostrils, until medical assistance becomes available. 2 each 0    omega 3-dha-epa-fish oil (Fish OiL) 1,000 mg (120 mg-180 mg) capsule Take by mouth.      Ozempic 1 mg/dose (4 mg/3 mL) pen injector Inject 1 mg under the skin.      amitriptyline (Elavil) 75 mg tablet TAKE 1 TABLET (75 MG) BY MOUTH AT BEDTIME 90 tablet 3    DULoxetine (Cymbalta) 60 mg DR capsule Take one tablet daily 90 capsule 3    omeprazole (PriLOSEC) 20 mg DR capsule Take 1 capsule (20 mg) by mouth.      tiZANidine (Zanaflex) 4 mg capsule Take 1 capsule (4 mg) by mouth 2 times a day as needed for muscle spasms. 180 capsule 1     No current facility-administered medications for this visit.         has a past medical history of Other chronic pain, Other conditions influencing health status (11/03/2014), Personal history of other diseases of the digestive system, Personal history of other diseases of the musculoskeletal system and connective tissue, Personal history of other diseases of the musculoskeletal system and connective tissue, Personal history of other diseases of the musculoskeletal system and connective tissue, Personal history of other mental and behavioral disorders, and Pure hypercholesterolemia, unspecified.   Past Surgical History:   Procedure Laterality Date    BREAST LUMPECTOMY  11/03/2014    Breast Surgery Lumpectomy    CERVICAL FUSION   10/23/2013    Cervical Vertebral Fusion     SECTION, CLASSIC  2014     Section    HYSTERECTOMY  2014    Hysterectomy    LUMBAR FUSION  10/23/2013    Lumbar Vertebral Fusion    OTHER SURGICAL HISTORY  2016    Oophorectomy - Bilateral (Removal Of Both Ovaries)      Social History     Tobacco Use    Smoking status: Never    Smokeless tobacco: Never   Substance Use Topics    Alcohol use: Never      family history includes Breast cancer in her paternal grandmother.  Pain Management Panel  More data exists         Latest Ref Rng & Units 2024 3/14/2023   Pain Management Panel   Amphetamine Screen, Urine Presumptive Negative Presumptive Negative  PRESUMPTIVE NEGATIVE    Barbiturate Screen, Urine Presumptive Negative Presumptive Negative  PRESUMPTIVE NEGATIVE    Codeine IgE <50 ng/mL <50  <50    Fentanyl Screen, Urine NEGATIVE - PRESUMPTIVE NEGATIVE    Hydromorphone Urine <25 ng/mL <25  <25    Methadone Screen, Urine NEGATIVE - PRESUMPTIVE NEGATIVE    Morphine  <50 ng/mL <50  <50         Vitals:    25 1048   BP: 125/81   Pulse: 93   SpO2: 96%       Lab Results   Component Value Date    RF <10 2019    SEDRATE 11 2019    CRP 1.46 (A) 2019       PHYSICAL EXAM      NODES -  HEART  LUNGS  ABDOMEN   VASCULAR  NEURO DTR normal prox distal st normal   SKIN-  JOINTS feet normal no synovitis  Shoulder and C spine normal  Hands some Heb and belle nodes and bony swelling IP thumb l and pip and dip allison  No active synovitis     PLAN    OA DIFFUSE AND HANDS  FIBROMYALGIA  CHRONIC PAIN    LABS AT [facility] AND [facility] REVIEWED AND OK     CONT ALL MEDS  USE TYLENOL FOR PAINFUL JOINTS     SEE 6 MOS

## 2025-03-28 ENCOUNTER — APPOINTMENT (OUTPATIENT)
Dept: RHEUMATOLOGY | Facility: CLINIC | Age: 60
End: 2025-03-28
Payer: COMMERCIAL

## 2025-03-28 VITALS
BODY MASS INDEX: 29.87 KG/M2 | WEIGHT: 174 LBS | SYSTOLIC BLOOD PRESSURE: 125 MMHG | DIASTOLIC BLOOD PRESSURE: 81 MMHG | OXYGEN SATURATION: 96 % | HEART RATE: 93 BPM

## 2025-03-28 DIAGNOSIS — M19.041 PRIMARY OSTEOARTHRITIS OF BOTH HANDS: ICD-10-CM

## 2025-03-28 DIAGNOSIS — M15.0 PRIMARY OSTEOARTHRITIS INVOLVING MULTIPLE JOINTS: ICD-10-CM

## 2025-03-28 DIAGNOSIS — M15.9 POLYARTICULAR OSTEOARTHRITIS: ICD-10-CM

## 2025-03-28 DIAGNOSIS — M79.7 FIBROMYALGIA: Primary | ICD-10-CM

## 2025-03-28 DIAGNOSIS — M19.042 PRIMARY OSTEOARTHRITIS OF BOTH HANDS: ICD-10-CM

## 2025-03-28 DIAGNOSIS — G89.29 OTHER CHRONIC PAIN: ICD-10-CM

## 2025-03-28 PROCEDURE — 3074F SYST BP LT 130 MM HG: CPT | Performed by: INTERNAL MEDICINE

## 2025-03-28 PROCEDURE — 3079F DIAST BP 80-89 MM HG: CPT | Performed by: INTERNAL MEDICINE

## 2025-03-28 PROCEDURE — 99214 OFFICE O/P EST MOD 30 MIN: CPT | Performed by: INTERNAL MEDICINE

## 2025-03-28 RX ORDER — DULOXETIN HYDROCHLORIDE 60 MG/1
CAPSULE, DELAYED RELEASE ORAL
Qty: 90 CAPSULE | Refills: 3 | Status: SHIPPED | OUTPATIENT
Start: 2025-03-28

## 2025-03-28 RX ORDER — AMITRIPTYLINE HYDROCHLORIDE 75 MG/1
TABLET ORAL
Qty: 90 TABLET | Refills: 3 | Status: SHIPPED | OUTPATIENT
Start: 2025-03-28

## 2025-03-28 RX ORDER — TIZANIDINE HYDROCHLORIDE 4 MG/1
4 CAPSULE, GELATIN COATED ORAL 2 TIMES DAILY PRN
Qty: 180 CAPSULE | Refills: 1 | Status: SHIPPED | OUTPATIENT
Start: 2025-03-28 | End: 2025-09-24

## 2025-03-28 ASSESSMENT — PAIN SCALES - GENERAL: PAINLEVEL_OUTOF10: 5

## 2025-04-01 ENCOUNTER — OFFICE VISIT (OUTPATIENT)
Dept: PAIN MEDICINE | Facility: HOSPITAL | Age: 60
End: 2025-04-01
Payer: COMMERCIAL

## 2025-04-01 DIAGNOSIS — M96.1 LUMBAR POSTLAMINECTOMY SYNDROME: ICD-10-CM

## 2025-04-01 DIAGNOSIS — M96.1 CERVICAL POSTLAMINECTOMY SYNDROME: ICD-10-CM

## 2025-04-01 DIAGNOSIS — M79.7 FIBROMYALGIA: Primary | ICD-10-CM

## 2025-04-01 PROCEDURE — 1036F TOBACCO NON-USER: CPT | Performed by: ANESTHESIOLOGY

## 2025-04-01 PROCEDURE — 99214 OFFICE O/P EST MOD 30 MIN: CPT | Performed by: ANESTHESIOLOGY

## 2025-04-01 RX ORDER — CYCLOBENZAPRINE HCL 10 MG
10 TABLET ORAL 2 TIMES DAILY PRN
Qty: 60 TABLET | Refills: 0 | Status: SHIPPED | OUTPATIENT
Start: 2025-04-01 | End: 2025-05-01

## 2025-04-01 ASSESSMENT — PAIN SCALES - GENERAL: PAINLEVEL_OUTOF10: 6

## 2025-04-01 ASSESSMENT — PAIN - FUNCTIONAL ASSESSMENT: PAIN_FUNCTIONAL_ASSESSMENT: 0-10

## 2025-04-01 NOTE — PROGRESS NOTES
Subjective   Patient ID: Jazzy Napoles is a 59 y.o. female with a past medical history of C5-7 ACDF, L4-5 laminectomy      HPI:   Ms. Jazzy Napoles is an established patient here for follow-up after new onset chest and shoulder pain.  At her last office visit with our nurse practitioner, she had new back pain between her shoulder blades that radiated into her anterior chest.  She subsequently went to the emergency department and ruled out dangerous cardiac etiologies.  The pain nearly completely resolved after couple days and with a TENS unit/ice packs.  The pain has since returned, but much less severe than before.  The pain is now between her shoulder blades and radiates to her lateral chest.  No longer has anterior chest pain.     Last BOB 2020  Last lumbar transforaminal IVON 6/20/2024    Physical Therapy: The patient completed more than six weeks of formal physical therapy more than six months ago, but has done physician-directed exercises at home for 2-3 times per week for greater than six weeks with minimal improvement  Other Conservative Measures she has tried: TENS, Heating Pad, Ice, Massage/myofascial release, and Injections  Classes of medications tried in the past: Acetaminophen, NSAIDs, SNRIs , Muscle Relaxants, Topical agents, and Opioids    0-10 (Numeric) Pain Score: 6       Review of Systems   13-point ROS done and negative except for HPI.     Current Outpatient Medications   Medication Instructions    ALPRAZolam (XANAX) 1 mg, 3 times daily PRN    amitriptyline (Elavil) 75 mg tablet TAKE 1 TABLET (75 MG) BY MOUTH AT BEDTIME    chlorhexidine (Peridex) 0.12 % solution RINSE 15 ML'S TWICE DAILY AFTER BREAKFAST/BEFORE BEDTIME FOLLOWING BRUSHING AND FLOSSING    DULoxetine (Cymbalta) 60 mg DR capsule Take one tablet daily    glipiZIDE (Glucotrol) 10 mg tablet     HYDROcodone-acetaminophen (Norco) 5-325 mg tablet 1 tablet, oral, 2 times daily PRN    lidocaine (Lidoderm) 5 % patch Use one patch  every 12 hours as needed for pain    loratadine (CLARITIN) 10 mg, Every 12 hours    metFORMIN (GLUCOPHAGE) 1,000 mg, 2 times daily (morning and late afternoon)    metoprolol tartrate (Lopressor) 25 mg tablet Every 12 hours    naloxone (NARCAN) 4 mg, nasal, As needed, May repeat every 2-3 minutes if needed, alternating nostrils, until medical assistance becomes available.    omega 3-dha-epa-fish oil (Fish OiL) 1,000 mg (120 mg-180 mg) capsule Take by mouth.    omeprazole (PRILOSEC) 20 mg    Ozempic 1 mg    tiZANidine (ZANAFLEX) 4 mg, oral, 2 times daily PRN       Past Medical History:   Diagnosis Date    Other chronic pain     Chronic pain    Other conditions influencing health status 2014    History of dyspareunia    Personal history of other diseases of the digestive system     History of esophageal reflux    Personal history of other diseases of the musculoskeletal system and connective tissue     History of arthritis    Personal history of other diseases of the musculoskeletal system and connective tissue     History of low back pain    Personal history of other diseases of the musculoskeletal system and connective tissue     History of backache    Personal history of other mental and behavioral disorders     History of depression    Pure hypercholesterolemia, unspecified     High cholesterol        Past Surgical History:   Procedure Laterality Date    BREAST LUMPECTOMY  2014    Breast Surgery Lumpectomy    CERVICAL FUSION  10/23/2013    Cervical Vertebral Fusion     SECTION, CLASSIC  2014     Section    HYSTERECTOMY  2014    Hysterectomy    LUMBAR FUSION  10/23/2013    Lumbar Vertebral Fusion    OTHER SURGICAL HISTORY  2016    Oophorectomy - Bilateral (Removal Of Both Ovaries)        Family History   Problem Relation Name Age of Onset    Breast cancer Paternal Grandmother          Allergies   Allergen Reactions    Bactrim [Sulfamethoxazole-Trimethoprim] Swelling     Celexa [Citalopram] Swelling    Darvocet A500 [Propoxyphene N-Acetaminophen] Swelling    Hydromorphone GI Upset    Methocarbamol Unknown    Morphine GI Upset    Pregabalin Swelling    Statins-Hmg-Coa Reductase Inhibitors Itching    Sulfa (Sulfonamide Antibiotics) Itching    Gabapentin Palpitations    Meloxicam Palpitations    Penicillin Palpitations        Objective     There were no vitals filed for this visit.     Physical Exam  General: NAD, well groomed, well nourished  Eyes: Non-icteric sclera, EOMI  Ears, Nose, Mouth, and Throat: External ears and nose appear to be without deformity or rash. No lesions or masses noted. Hearing is grossly intact.   Neck: Trachea midline  Respiratory: Nonlabored breathing   Cardiovascular: no peripheral edema   Skin: No rashes or open lesions/ulcers identified on skin.    Neck:  Spurling positive on the right    Thorax:  Mildly tender to palpation between the bilateral scapula  No tenderness to the bilateral anterior chest wall    Back:   Palpation: No tenderness to palpation over lumbar paraspinous muscles.   Straight leg raise: does not reproduce their pain, bilaterally     Neurologic:   Cranial nerves grossly intact.   Strength 5/5 and symmetric plantar/dorsiflexion   Sensation: Normal to light touch throughout, pinprick  intact throughout.  DTRs:normal and symmetric throughout  Noel: absent    Psychiatric: Alert, orientation to person, place, and time. Cooperative.    Imaging personally reviewed and independently interpreted:   Lumbar MRI 2022  FINDINGS:  The marrow signal and vertebral body height are normal. The conus and  sacrum are normal.  Images at each interspace reveal the following:  L1/L2  There is normal alignment and vertebral body height. The disc space  is normal. There is no evidence of canal or foraminal narrowing.  There is no evidence of bulging or herniated disc.  L2/L3  Bilateral facet hypertrophy and mild bulging disc without canal or  foraminal  narrowing  L3/L4  Previous interbody fusion and posterior fixation without canal or  foraminal narrowing  L4/L5  Previous laminectomy with residual deformity of the thecal sac  unchanged from the previous exam. There is been interval development  of a midline disc herniation or focus of granulation tissue measuring  approximally 5 mm x 10 mm in size and best appreciated on  parasagittal T2 weighted image 11/19. There is been progressive  narrowing of the thecal sac at this level which is best appreciated  on axial 10/24 compared to the previous exam.  L5/S1  Bilateral facet hypertrophy and bulging intervertebral disc. No  measurable canal stenosis. Severe bilateral foraminal narrowing.        IMPRESSION:  * Postoperative changes as previously described  *Unchanged bulging discs at L4/L5 and L5/S1 with progressive canal  narrowing at L4/L5 due to a midline soft tissue structure consistent  with herniated disc fragment or evolving granulation tissue..     Cervical MRI 2017  FINDINGS:  Alignment: Patient is status post anterior fusion of C5-C7 levels.  There is attenuation of cervical curvature. The vertebral alignment  is maintained.     Vertebrae/Intervertebral Discs: Evaluation of the C5, C6 and C7  vertebral bodies is significantly limited due to artifact from fusion  hardware. Remainder of the vertebral bodies demonstrate expected  height and marrow signal.     Several intervertebral discs at the unfused levels demonstrate  diminished T2 signal suggestive of desiccation.     Cord: Normal in caliber and signal.     C1-C2: The cervicomedullary junction appears unremarkable. There is  no central canal stenosis.     C2-C3: There is mild left-sided uncovertebral joint and facet  hypertrophy with minimal left neural foraminal narrowing. The right  neural foramina is patent. There is no central canal stenosis.     C3-C4: There is a small posterior disc osteophyte complex with mild  bilateral facet and uncovertebral joint  hypertrophy. There is minimal  overall central canal stenosis with minimal encroachment upon  bilateral neural foramina, right greater than left.     C4-C5: There is a prominent central disc osteophyte complex which  causes effacement of ventral thecal sac and indents the ventral  aspect of cord. There is mild overall central canal stenosis.  Bilateral uncovertebral joint and facet hypertrophy results in mild  right and moderate left neural foraminal narrowing.     C5-C6: Patient is status post fusion. There is no significant central  canal or neural foraminal stenosis.     C6-C7: Patient is status post fusion. There is left-sided  uncovertebral joint and facet hypertrophy with mild-to-moderate left  neural foraminal narrowing, right neural foramina is patent. There is  no significant central canal stenosis.     C7-T1: There is left-sided uncovertebral joint and facet hypertrophy  with mild left neural foraminal narrowing, right neural foramina is  patent. There is no central canal stenosis.     The upper thoracic vertebrae and spinal canal are unremarkable.     The prevertebral and posterior paraspinous soft tissues are within  normal limits.     There are few nonspecific nodes scattered in bilateral neck.     IMPRESSION:  Cervical spondylosis status post anterior fusion of C5-C7 levels.  There are multilevel degenerative changes with varying severity of  neural foramina encroachment as discussed. The degenerative changes  at C4-C5 appear mildly worse as compared to prior study.    Assessment/Plan   Ms. Purcell is a 59-year-old female with fibromyalgia and cervical/lumbar postlaminectomy syndrome.  She is doing well with her narcotic therapy in addition to occasional BOB and caudal epidural steroid injections.  She had new onset back pain that radiated into her anterior chest at her last office visit with her nurse practitioner.  She was evaluated in the ED and ruled out life-threatening cardiac causes of her  pain.  Her pain is most likely musculoskeletal in nature as she has had improvement with TENS unit and ice.  Will refer to [facility] for evaluation for acupuncture, physical therapy, massage therapy, relaxation techniques.  She also endorses better relief with Flexeril and tizanidine, we will make the switch.    Plan:  -Referral to [facility].  -Switch tizanidine to Flexeril 10 mg twice daily.  Side effect profile and risk reviewed.  Caution with other medications which include amitriptyline and duloxetine.  Advised to take sparingly.  -Referral to PT for neck/shoulder pain.    Due to the fact that she has already had 80% reduction in pain with some conservative measures including icing and stretching, I do anticipate her pain will improve without invasive measures.  If she were to not have resolution after physical therapy we could consider an epidural injection, cervical with right biased to help with her current symptomatology.    Follow up: As needed     The patient was invited to contact us back anytime with any questions or concerns and follow-up with us in the office as needed.     Diagnoses and all orders for this visit:  Fibromyalgia  Lumbar postlaminectomy syndrome  Cervical postlaminectomy syndrome      This note was generated with the aid of dictation software, there may be typos despite my attempts at proofreading.    Lul Padilla, DO  Pain Fellow

## 2025-04-08 DIAGNOSIS — M79.7 FIBROMYALGIA: ICD-10-CM

## 2025-04-08 DIAGNOSIS — M15.9 POLYARTICULAR OSTEOARTHRITIS: ICD-10-CM

## 2025-04-08 DIAGNOSIS — M96.1 POST LAMINECTOMY SYNDROME: ICD-10-CM

## 2025-04-08 NOTE — TELEPHONE ENCOUNTER
PA was approved! Please send to [facility].    Prescription Request for Lidocaine patch 5%        Has The Patient Been Identified By Name And Date Of Birth: yes    RX Requestor: patient     Date of Last Refill: 11/13/24    Date Of Last Office Visit: 3/28/25    Date Of Future Office Visit: NONE

## 2025-04-09 RX ORDER — LIDOCAINE 50 MG/G
PATCH TOPICAL
Qty: 30 PATCH | Refills: 5 | Status: SHIPPED | OUTPATIENT
Start: 2025-04-09

## 2025-06-29 LAB
1OH-MIDAZOLAM UR-MCNC: NEGATIVE NG/ML
7AMINOCLONAZEPAM UR-MCNC: NEGATIVE NG/ML
A-OH ALPRAZ UR-MCNC: 109 NG/ML
A-OH-TRIAZOLAM UR-MCNC: NEGATIVE NG/ML
AMPHETAMINES UR QL: NEGATIVE NG/ML
BARBITURATES UR QL: NEGATIVE NG/ML
BENZODIAZ UR QL: POSITIVE NG/ML
BZE UR QL: NEGATIVE NG/ML
CODEINE UR-MCNC: NEGATIVE NG/ML
CREAT UR-MCNC: 58.2 MG/DL
DRUG SCREEN COMMENT UR-IMP: ABNORMAL
DRUG SCREEN COMMENT UR-IMP: ABNORMAL
HYDROCODONE UR-MCNC: 278 NG/ML
HYDROMORPHONE UR-MCNC: NEGATIVE NG/ML
LORAZEPAM UR-MCNC: NEGATIVE NG/ML
METHADONE UR QL: NEGATIVE NG/ML
MORPHINE UR-MCNC: NEGATIVE NG/ML
NORDIAZEPAM UR-MCNC: NEGATIVE NG/ML
NORHYDROCODONE UR CFM-MCNC: 338 NG/ML
OH-ETHYLFLURAZ UR-MCNC: NEGATIVE NG/ML
OPIATES UR QL: POSITIVE NG/ML
OXAZEPAM UR-MCNC: NEGATIVE NG/ML
OXIDANTS UR QL: NEGATIVE MCG/ML
OXYCODONE UR QL: NEGATIVE NG/ML
PCP UR QL: NEGATIVE NG/ML
PH UR: 5.6 [PH] (ref 4.5–9)
QUEST NOTES AND COMMENTS: ABNORMAL
TEMAZEPAM UR-MCNC: NEGATIVE NG/ML
THC UR QL: NEGATIVE NG/ML

## 2025-07-03 ENCOUNTER — APPOINTMENT (OUTPATIENT)
Dept: PAIN MEDICINE | Facility: CLINIC | Age: 60
End: 2025-07-03
Payer: COMMERCIAL

## 2025-07-03 DIAGNOSIS — M96.1 FAILED BACK SYNDROME, CERVICAL: ICD-10-CM

## 2025-07-03 DIAGNOSIS — M54.16 LUMBAR RADICULOPATHY: ICD-10-CM

## 2025-07-03 DIAGNOSIS — M96.1 POSTLAMINECTOMY SYNDROME OF LUMBAR REGION: ICD-10-CM

## 2025-07-03 DIAGNOSIS — M54.12 CERVICAL RADICULITIS: ICD-10-CM

## 2025-07-03 RX ORDER — HYDROCODONE BITARTRATE AND ACETAMINOPHEN 5; 325 MG/1; MG/1
1 TABLET ORAL 2 TIMES DAILY PRN
Qty: 60 TABLET | Refills: 0 | Status: SHIPPED | OUTPATIENT
Start: 2025-07-03 | End: 2025-08-02

## 2025-08-14 ENCOUNTER — OFFICE VISIT (OUTPATIENT)
Dept: PAIN MEDICINE | Facility: CLINIC | Age: 60
End: 2025-08-14
Payer: COMMERCIAL

## 2025-08-14 ENCOUNTER — APPOINTMENT (OUTPATIENT)
Dept: PAIN MEDICINE | Facility: HOSPITAL | Age: 60
End: 2025-08-14
Payer: COMMERCIAL

## 2025-08-14 DIAGNOSIS — M54.16 LUMBAR RADICULOPATHY: ICD-10-CM

## 2025-08-14 DIAGNOSIS — M96.1 POSTLAMINECTOMY SYNDROME OF LUMBAR REGION: ICD-10-CM

## 2025-08-14 DIAGNOSIS — Z79.899 ENCOUNTER FOR LONG-TERM (CURRENT) USE OF HIGH-RISK MEDICATION: Primary | ICD-10-CM

## 2025-08-14 DIAGNOSIS — M96.1 FAILED BACK SYNDROME, CERVICAL: ICD-10-CM

## 2025-08-14 PROCEDURE — 1036F TOBACCO NON-USER: CPT | Performed by: NURSE PRACTITIONER

## 2025-08-14 PROCEDURE — 99213 OFFICE O/P EST LOW 20 MIN: CPT | Performed by: NURSE PRACTITIONER

## 2025-08-14 RX ORDER — HYDROCODONE BITARTRATE AND ACETAMINOPHEN 5; 325 MG/1; MG/1
1 TABLET ORAL 2 TIMES DAILY PRN
Qty: 60 TABLET | Refills: 0 | Status: SHIPPED | OUTPATIENT
Start: 2025-08-14 | End: 2025-09-13

## 2025-08-21 ENCOUNTER — APPOINTMENT (OUTPATIENT)
Dept: PAIN MEDICINE | Facility: CLINIC | Age: 60
End: 2025-08-21
Payer: COMMERCIAL

## 2025-09-29 ENCOUNTER — APPOINTMENT (OUTPATIENT)
Dept: RHEUMATOLOGY | Facility: CLINIC | Age: 60
End: 2025-09-29
Payer: COMMERCIAL